# Patient Record
Sex: FEMALE | Race: WHITE | NOT HISPANIC OR LATINO
[De-identification: names, ages, dates, MRNs, and addresses within clinical notes are randomized per-mention and may not be internally consistent; named-entity substitution may affect disease eponyms.]

---

## 2017-01-20 ENCOUNTER — APPOINTMENT (OUTPATIENT)
Dept: PULMONOLOGY | Facility: CLINIC | Age: 77
End: 2017-01-20

## 2017-01-20 ENCOUNTER — APPOINTMENT (OUTPATIENT)
Dept: SURGERY | Facility: CLINIC | Age: 77
End: 2017-01-20

## 2017-01-20 VITALS
DIASTOLIC BLOOD PRESSURE: 71 MMHG | TEMPERATURE: 98.1 F | SYSTOLIC BLOOD PRESSURE: 126 MMHG | OXYGEN SATURATION: 97 % | HEIGHT: 61 IN | HEART RATE: 61 BPM

## 2017-03-10 ENCOUNTER — APPOINTMENT (OUTPATIENT)
Dept: SURGERY | Facility: CLINIC | Age: 77
End: 2017-03-10

## 2017-03-10 VITALS
TEMPERATURE: 98.1 F | DIASTOLIC BLOOD PRESSURE: 72 MMHG | BODY MASS INDEX: 24.92 KG/M2 | HEIGHT: 61 IN | HEART RATE: 64 BPM | WEIGHT: 132 LBS | SYSTOLIC BLOOD PRESSURE: 123 MMHG | OXYGEN SATURATION: 95 %

## 2017-03-10 RX ORDER — CYCLOSPORINE 0.5 MG/ML
0.05 EMULSION OPHTHALMIC
Qty: 60 | Refills: 0 | Status: ACTIVE | COMMUNITY
Start: 2016-03-16

## 2017-03-10 RX ORDER — OXYCODONE AND ACETAMINOPHEN 5; 325 MG/1; MG/1
5-325 TABLET ORAL
Qty: 30 | Refills: 0 | Status: DISCONTINUED | COMMUNITY
Start: 2016-12-23

## 2017-07-18 ENCOUNTER — APPOINTMENT (OUTPATIENT)
Dept: SURGERY | Facility: CLINIC | Age: 77
End: 2017-07-18

## 2017-07-18 VITALS
SYSTOLIC BLOOD PRESSURE: 114 MMHG | OXYGEN SATURATION: 95 % | HEIGHT: 61 IN | DIASTOLIC BLOOD PRESSURE: 71 MMHG | HEART RATE: 70 BPM

## 2017-07-18 DIAGNOSIS — K21.9 GASTRO-ESOPHAGEAL REFLUX DISEASE W/OUT ESOPHAGITIS: ICD-10-CM

## 2017-07-18 DIAGNOSIS — D64.9 ANEMIA, UNSPECIFIED: ICD-10-CM

## 2017-07-18 RX ORDER — CLOBETASOL PROPIONATE 0.5 MG/G
0.05 OINTMENT TOPICAL
Qty: 30 | Refills: 0 | Status: ACTIVE | COMMUNITY
Start: 2017-06-14

## 2017-07-18 RX ORDER — VALACYCLOVIR 1 G/1
1 TABLET, FILM COATED ORAL
Qty: 10 | Refills: 0 | Status: ACTIVE | COMMUNITY
Start: 2017-06-17

## 2017-09-06 ENCOUNTER — OUTPATIENT (OUTPATIENT)
Dept: OUTPATIENT SERVICES | Facility: HOSPITAL | Age: 77
LOS: 1 days | End: 2017-09-06
Payer: MEDICARE

## 2017-09-06 DIAGNOSIS — O03.9 COMPLETE OR UNSPECIFIED SPONTANEOUS ABORTION WITHOUT COMPLICATION: Chronic | ICD-10-CM

## 2017-09-06 DIAGNOSIS — Z90.49 ACQUIRED ABSENCE OF OTHER SPECIFIED PARTS OF DIGESTIVE TRACT: Chronic | ICD-10-CM

## 2017-09-06 DIAGNOSIS — Z90.12 ACQUIRED ABSENCE OF LEFT BREAST AND NIPPLE: Chronic | ICD-10-CM

## 2017-09-06 DIAGNOSIS — Z98.890 OTHER SPECIFIED POSTPROCEDURAL STATES: Chronic | ICD-10-CM

## 2017-09-06 PROCEDURE — 74241: CPT

## 2017-09-06 PROCEDURE — 74241: CPT | Mod: 26

## 2017-09-08 ENCOUNTER — APPOINTMENT (OUTPATIENT)
Dept: SURGERY | Facility: CLINIC | Age: 77
End: 2017-09-08

## 2017-09-15 ENCOUNTER — APPOINTMENT (OUTPATIENT)
Dept: SURGERY | Facility: CLINIC | Age: 77
End: 2017-09-15
Payer: MEDICARE

## 2017-09-15 VITALS
HEIGHT: 61 IN | WEIGHT: 125 LBS | DIASTOLIC BLOOD PRESSURE: 69 MMHG | BODY MASS INDEX: 23.6 KG/M2 | OXYGEN SATURATION: 95 % | HEART RATE: 68 BPM | SYSTOLIC BLOOD PRESSURE: 128 MMHG | TEMPERATURE: 97.8 F

## 2017-09-15 DIAGNOSIS — K44.9 DIAPHRAGMATIC HERNIA W/OUT OBSTRUCTION OR GANGRENE: ICD-10-CM

## 2017-09-15 PROCEDURE — 99213 OFFICE O/P EST LOW 20 MIN: CPT

## 2018-03-06 ENCOUNTER — APPOINTMENT (OUTPATIENT)
Dept: NEUROLOGY | Facility: CLINIC | Age: 78
End: 2018-03-06
Payer: MEDICARE

## 2018-03-06 VITALS
WEIGHT: 125 LBS | BODY MASS INDEX: 23.6 KG/M2 | HEIGHT: 61 IN | HEART RATE: 103 BPM | DIASTOLIC BLOOD PRESSURE: 85 MMHG | OXYGEN SATURATION: 93 % | SYSTOLIC BLOOD PRESSURE: 142 MMHG

## 2018-03-06 DIAGNOSIS — R20.0 ANESTHESIA OF SKIN: ICD-10-CM

## 2018-03-06 DIAGNOSIS — Z00.00 ENCOUNTER FOR GENERAL ADULT MEDICAL EXAMINATION W/OUT ABNORMAL FINDINGS: ICD-10-CM

## 2018-03-06 DIAGNOSIS — R41.3 OTHER AMNESIA: ICD-10-CM

## 2018-03-06 PROCEDURE — 99204 OFFICE O/P NEW MOD 45 MIN: CPT

## 2018-03-06 RX ORDER — GINKGO BILOBA LEAF EXTRACT 120 MG
120 CAPSULE ORAL
Refills: 0 | Status: ACTIVE | COMMUNITY

## 2018-03-06 RX ORDER — DOCUSATE SODIUM 100 MG/1
100 CAPSULE, LIQUID FILLED ORAL
Refills: 0 | Status: ACTIVE | COMMUNITY

## 2018-03-06 RX ORDER — BIOTIN 10 MG
10000 TABLET ORAL
Refills: 0 | Status: ACTIVE | COMMUNITY

## 2018-03-06 RX ORDER — ASCORBIC ACID 500 MG
500 TABLET ORAL DAILY
Refills: 0 | Status: ACTIVE | COMMUNITY

## 2018-03-06 RX ORDER — ESCITALOPRAM OXALATE 20 MG/1
20 TABLET ORAL DAILY
Refills: 0 | Status: ACTIVE | COMMUNITY

## 2018-03-06 RX ORDER — BERBERINE CHLOR/SEAWEED/CHROM 500-250 MG
CAPSULE ORAL
Refills: 0 | Status: ACTIVE | COMMUNITY

## 2018-05-16 ENCOUNTER — OUTPATIENT (OUTPATIENT)
Dept: OUTPATIENT SERVICES | Facility: HOSPITAL | Age: 78
LOS: 1 days | Discharge: HOME | End: 2018-05-16

## 2018-05-16 DIAGNOSIS — Z90.12 ACQUIRED ABSENCE OF LEFT BREAST AND NIPPLE: Chronic | ICD-10-CM

## 2018-05-16 DIAGNOSIS — O03.9 COMPLETE OR UNSPECIFIED SPONTANEOUS ABORTION WITHOUT COMPLICATION: Chronic | ICD-10-CM

## 2018-05-16 DIAGNOSIS — Z90.49 ACQUIRED ABSENCE OF OTHER SPECIFIED PARTS OF DIGESTIVE TRACT: Chronic | ICD-10-CM

## 2018-05-16 DIAGNOSIS — Z98.890 OTHER SPECIFIED POSTPROCEDURAL STATES: Chronic | ICD-10-CM

## 2018-06-20 ENCOUNTER — APPOINTMENT (OUTPATIENT)
Dept: NEUROLOGY | Facility: CLINIC | Age: 78
End: 2018-06-20

## 2018-07-25 ENCOUNTER — OUTPATIENT (OUTPATIENT)
Dept: OUTPATIENT SERVICES | Facility: HOSPITAL | Age: 78
LOS: 1 days | Discharge: HOME | End: 2018-07-25

## 2018-07-25 DIAGNOSIS — Z90.49 ACQUIRED ABSENCE OF OTHER SPECIFIED PARTS OF DIGESTIVE TRACT: Chronic | ICD-10-CM

## 2018-07-25 DIAGNOSIS — Z98.890 OTHER SPECIFIED POSTPROCEDURAL STATES: Chronic | ICD-10-CM

## 2018-07-25 DIAGNOSIS — O03.9 COMPLETE OR UNSPECIFIED SPONTANEOUS ABORTION WITHOUT COMPLICATION: Chronic | ICD-10-CM

## 2018-07-25 DIAGNOSIS — Z90.12 ACQUIRED ABSENCE OF LEFT BREAST AND NIPPLE: Chronic | ICD-10-CM

## 2018-11-05 ENCOUNTER — APPOINTMENT (OUTPATIENT)
Dept: NEUROLOGY | Facility: CLINIC | Age: 78
End: 2018-11-05

## 2019-12-23 ENCOUNTER — APPOINTMENT (OUTPATIENT)
Dept: NEUROSURGERY | Facility: CLINIC | Age: 79
End: 2019-12-23
Payer: MEDICARE

## 2019-12-23 VITALS
HEART RATE: 75 BPM | DIASTOLIC BLOOD PRESSURE: 70 MMHG | RESPIRATION RATE: 17 BRPM | HEIGHT: 61 IN | SYSTOLIC BLOOD PRESSURE: 111 MMHG | TEMPERATURE: 98.1 F | BODY MASS INDEX: 22.09 KG/M2 | OXYGEN SATURATION: 95 % | WEIGHT: 117 LBS

## 2019-12-23 DIAGNOSIS — Z87.01 PERSONAL HISTORY OF PNEUMONIA (RECURRENT): ICD-10-CM

## 2019-12-23 DIAGNOSIS — M81.0 AGE-RELATED OSTEOPOROSIS W/OUT CURRENT PATHOLOGICAL FRACTURE: ICD-10-CM

## 2019-12-23 DIAGNOSIS — Z82.49 FAMILY HISTORY OF ISCHEMIC HEART DISEASE AND OTHER DISEASES OF THE CIRCULATORY SYSTEM: ICD-10-CM

## 2019-12-23 DIAGNOSIS — Z81.8 FAMILY HISTORY OF OTHER MENTAL AND BEHAVIORAL DISORDERS: ICD-10-CM

## 2019-12-23 DIAGNOSIS — J44.9 CHRONIC OBSTRUCTIVE PULMONARY DISEASE, UNSPECIFIED: ICD-10-CM

## 2019-12-23 DIAGNOSIS — F41.9 ANXIETY DISORDER, UNSPECIFIED: ICD-10-CM

## 2019-12-23 DIAGNOSIS — K75.9 INFLAMMATORY LIVER DISEASE, UNSPECIFIED: ICD-10-CM

## 2019-12-23 DIAGNOSIS — Z82.61 FAMILY HISTORY OF ARTHRITIS: ICD-10-CM

## 2019-12-23 DIAGNOSIS — M19.90 UNSPECIFIED OSTEOARTHRITIS, UNSPECIFIED SITE: ICD-10-CM

## 2019-12-23 DIAGNOSIS — J42 UNSPECIFIED CHRONIC BRONCHITIS: ICD-10-CM

## 2019-12-23 PROCEDURE — 99205 OFFICE O/P NEW HI 60 MIN: CPT

## 2019-12-23 NOTE — PHYSICAL EXAM
[General Appearance - Alert] : alert [Oriented To Time, Place, And Person] : oriented to person, place, and time [General Appearance - Well-Appearing] : healthy appearing [Motor Handedness Right-Handed] : the patient is right hand dominant [Sensation Tactile Decrease] : light touch was intact [Sensation Pain / Temperature Decrease] : pain and temperature was intact [Balance] : balance was intact [Neck Appearance] : the appearance of the neck was normal [Sclera] : the sclera and conjunctiva were normal [Outer Ear] : the ears and nose were normal in appearance [Abnormal Walk] : normal gait [] : no respiratory distress [Skin Color & Pigmentation] : normal skin color and pigmentation

## 2019-12-23 NOTE — HISTORY OF PRESENT ILLNESS
[de-identified] : 79F never smoker with a medical history of COPD, asthma, paraesophageal hiatal hernia, GI bleed and left breast CA (on remission) s/p left mastectomy presents to the office to evaluate a known aneurysm. She was referred by her PCP, Dr. Fountain. The aneurysm was incidentally found in 2018 during a workup after experiencing RIGHT arm numbness/tingling/loss of hand  . She reports an aneurysm size of 3.3 mm in 2018 and with a growth to 6.0 mm of an anterior communicating artery aneurysm in December 2019. MRA also reports a absent flow signal within the A1 segment of the LEFT URVASHI.\par \par She denies headaches. She denies history of HTN or family history of brain aneurysm. She is not currently on ASA or blood thinners.\par \par \par Handedness:\par \par Patient Address:\par [address]\[place] [address]\par \par Referring MD and PCP:\par [address] [address]\par \par Neurologist:\par Dr. Leo Hemphill\par [address] [facility], [address]\[place] [address]\par \par \par

## 2019-12-23 NOTE — SOCIAL HISTORY
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

## 2019-12-23 NOTE — REVIEW OF SYSTEMS
[Arm Weakness] : arm weakness [Hand Weakness] :  hand weakness [Numbness] : numbness [Tingling] : tingling [Hypersensitivity] : hypersensitivity [Dry Eyes] : dryness of the eyes [Loss Of Hearing] : hearing loss [Joint Pain] : joint pain [Shortness Of Breath] : shortness of breath [Wheezing] : wheezing [Joint Swelling] : joint swelling [Joint Stiffness] : joint stiffness [Itching] : itching [Muscle Weakness] : muscle weakness [Feelings Of Weakness] : feelings of weakness [Negative] : Genitourinary

## 2019-12-23 NOTE — DATA REVIEWED
[de-identified] : BRAIN W/O CONTRAST 12/11/19 IMPRESSION:\par 1. 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 Absent flow signal w/in the A1 segment of the left URVASHI.\par 3. Of note, noninvasive methods such as MRA and CTA do not entirely exclude the presence of all aneurysms, particularily those of approx 3-4mm or less in size.\par BRAIN/NECK WITH AND W/O CONTRAST 11/2/18 IMPRESSION:\par 1. MRA BRAIN: There is no significant segmental stenosis involving the proximal intracranial arteries. 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 There is bulbous appearance of anterior communicating artery with an approximately 3 mm bilobed aneurysm.\par 2. MRA NECK: No evidence of large vessel occlusion or stenosis. There is approximately 30% narrowing of the left internal carotid artery bulb.

## 2019-12-23 NOTE — ASSESSMENT
[FreeTextEntry1] : Plan:\par - All imaging has been reviewed by Dr. Ceballos, in depth, and with patient. All questions answered.\par - Patient was extensively educated regarding his disease process, ACOMM Cerebral Aneurysm.\par - Dr. Ceballos has reviewed both surgical and non-surgical options with patient. He is recommending a diagnostic cerebral angiogram with possible coiling of ACOMM aneurysm.\par - Dr. Ceballos has reviewed the risks and benefits of this procedure to include, but not limit, pain, infection, stroke, hematoma, recurrence of disease, neurovascular injury, heart attack, pulmonary embolism, weakness and death have been carefully explained to the patient who clearly understands and agrees to proceed. Post-op care to include a 2 week f/u in office as well as a 3 month follow-up with new MRA.\par - Date of 1/17/20 has been agreed upon by patient, I have reviewed medical clearance packet with her to include Cardiology and probable Pulmonology clearance for hx of COPD/Asthma. Patient states that PCP has been handling all Pulm related issues, noted that PCP is to discern if patient will require Pulm clearance. Information provided for both Dr's Lyric (Cardi) and Amarjit (Pulm) should she require them. \par \par Patient verbalizes understanding and agreement with current plan.\par  \par \par

## 2020-01-17 ENCOUNTER — INPATIENT (INPATIENT)
Facility: HOSPITAL | Age: 80
LOS: 1 days | Discharge: HOME | DRG: 27 | End: 2020-01-19
Attending: RADIOLOGY | Admitting: RADIOLOGY
Payer: MEDICARE

## 2020-01-17 VITALS
DIASTOLIC BLOOD PRESSURE: 57 MMHG | HEART RATE: 87 BPM | SYSTOLIC BLOOD PRESSURE: 124 MMHG | OXYGEN SATURATION: 100 % | RESPIRATION RATE: 12 BRPM | TEMPERATURE: 98 F

## 2020-01-17 DIAGNOSIS — Z98.49 CATARACT EXTRACTION STATUS, UNSPECIFIED EYE: Chronic | ICD-10-CM

## 2020-01-17 DIAGNOSIS — Z90.12 ACQUIRED ABSENCE OF LEFT BREAST AND NIPPLE: Chronic | ICD-10-CM

## 2020-01-17 DIAGNOSIS — D05.12 INTRADUCTAL CARCINOMA IN SITU OF LEFT BREAST: ICD-10-CM

## 2020-01-17 DIAGNOSIS — J44.9 CHRONIC OBSTRUCTIVE PULMONARY DISEASE, UNSPECIFIED: ICD-10-CM

## 2020-01-17 DIAGNOSIS — Z90.49 ACQUIRED ABSENCE OF OTHER SPECIFIED PARTS OF DIGESTIVE TRACT: Chronic | ICD-10-CM

## 2020-01-17 DIAGNOSIS — Z98.890 OTHER SPECIFIED POSTPROCEDURAL STATES: Chronic | ICD-10-CM

## 2020-01-17 DIAGNOSIS — E78.5 HYPERLIPIDEMIA, UNSPECIFIED: ICD-10-CM

## 2020-01-17 DIAGNOSIS — B18.2 CHRONIC VIRAL HEPATITIS C: ICD-10-CM

## 2020-01-17 DIAGNOSIS — O03.9 COMPLETE OR UNSPECIFIED SPONTANEOUS ABORTION WITHOUT COMPLICATION: Chronic | ICD-10-CM

## 2020-01-17 DIAGNOSIS — I67.1 CEREBRAL ANEURYSM, NONRUPTURED: ICD-10-CM

## 2020-01-17 PROCEDURE — 36223 PLACE CATH CAROTID/INOM ART: CPT | Mod: 59,LT

## 2020-01-17 PROCEDURE — 76377 3D RENDER W/INTRP POSTPROCES: CPT | Mod: 26

## 2020-01-17 PROCEDURE — 99291 CRITICAL CARE FIRST HOUR: CPT

## 2020-01-17 PROCEDURE — 61624 TCAT PERM OCCLS/EMBOLJ CNS: CPT

## 2020-01-17 PROCEDURE — 36224 PLACE CATH CAROTD ART: CPT | Mod: RT

## 2020-01-17 PROCEDURE — 75898 FOLLOW-UP ANGIOGRAPHY: CPT | Mod: 26

## 2020-01-17 PROCEDURE — 99223 1ST HOSP IP/OBS HIGH 75: CPT

## 2020-01-17 PROCEDURE — 75894 X-RAYS TRANSCATH THERAPY: CPT | Mod: 26

## 2020-01-17 RX ORDER — HYDRALAZINE HCL 50 MG
5 TABLET ORAL ONCE
Refills: 0 | Status: COMPLETED | OUTPATIENT
Start: 2020-01-17 | End: 2020-01-17

## 2020-01-17 RX ORDER — ESCITALOPRAM OXALATE 10 MG/1
10 TABLET, FILM COATED ORAL DAILY
Refills: 0 | Status: DISCONTINUED | OUTPATIENT
Start: 2020-01-17 | End: 2020-01-19

## 2020-01-17 RX ORDER — SENNA PLUS 8.6 MG/1
2 TABLET ORAL AT BEDTIME
Refills: 0 | Status: DISCONTINUED | OUTPATIENT
Start: 2020-01-17 | End: 2020-01-19

## 2020-01-17 RX ORDER — CHLORHEXIDINE GLUCONATE 213 G/1000ML
1 SOLUTION TOPICAL ONCE
Refills: 0 | Status: DISCONTINUED | OUTPATIENT
Start: 2020-01-17 | End: 2020-01-18

## 2020-01-17 RX ORDER — IPRATROPIUM/ALBUTEROL SULFATE 18-103MCG
3 AEROSOL WITH ADAPTER (GRAM) INHALATION EVERY 6 HOURS
Refills: 0 | Status: DISCONTINUED | OUTPATIENT
Start: 2020-01-17 | End: 2020-01-19

## 2020-01-17 RX ORDER — ONDANSETRON 8 MG/1
4 TABLET, FILM COATED ORAL EVERY 6 HOURS
Refills: 0 | Status: DISCONTINUED | OUTPATIENT
Start: 2020-01-17 | End: 2020-01-19

## 2020-01-17 RX ORDER — ESCITALOPRAM OXALATE 10 MG/1
2 TABLET, FILM COATED ORAL
Qty: 0 | Refills: 0 | DISCHARGE

## 2020-01-17 RX ORDER — SODIUM CHLORIDE 9 MG/ML
1000 INJECTION INTRAMUSCULAR; INTRAVENOUS; SUBCUTANEOUS
Refills: 0 | Status: DISCONTINUED | OUTPATIENT
Start: 2020-01-17 | End: 2020-01-18

## 2020-01-17 RX ORDER — METOCLOPRAMIDE HCL 10 MG
10 TABLET ORAL EVERY 6 HOURS
Refills: 0 | Status: DISCONTINUED | OUTPATIENT
Start: 2020-01-17 | End: 2020-01-19

## 2020-01-17 RX ORDER — ASPIRIN/CALCIUM CARB/MAGNESIUM 324 MG
81 TABLET ORAL DAILY
Refills: 0 | Status: DISCONTINUED | OUTPATIENT
Start: 2020-01-17 | End: 2020-01-19

## 2020-01-17 RX ORDER — ACETAMINOPHEN 500 MG
650 TABLET ORAL EVERY 6 HOURS
Refills: 0 | Status: DISCONTINUED | OUTPATIENT
Start: 2020-01-17 | End: 2020-01-19

## 2020-01-17 RX ADMIN — Medication 1 DROP(S): at 19:11

## 2020-01-17 RX ADMIN — Medication 1 DROP(S): at 23:50

## 2020-01-17 RX ADMIN — Medication 1 DROP(S): at 16:14

## 2020-01-17 RX ADMIN — Medication 5 MILLIGRAM(S): at 16:14

## 2020-01-17 RX ADMIN — Medication 5 MILLIGRAM(S): at 21:39

## 2020-01-17 RX ADMIN — Medication 10 MILLIGRAM(S): at 19:25

## 2020-01-17 RX ADMIN — SENNA PLUS 2 TABLET(S): 8.6 TABLET ORAL at 21:39

## 2020-01-17 RX ADMIN — ONDANSETRON 4 MILLIGRAM(S): 8 TABLET, FILM COATED ORAL at 18:45

## 2020-01-17 RX ADMIN — Medication 1 DROP(S): at 21:38

## 2020-01-17 NOTE — BRIEF OPERATIVE NOTE - NSICDXBRIEFPROCEDURE_GEN_ALL_CORE_FT
PROCEDURES:  Angiogram, cerebral, with embolization 17-Jan-2020 11:33:47 ACOMM artery Edmundo Keating

## 2020-01-17 NOTE — H&P ADULT - NSHPSOCIALHISTORY_GEN_ALL_CORE
Patient lives alone in an elevator apartment. She quit smoking in the 1970s. She does not drink alcohol but smokes Marijuana.

## 2020-01-17 NOTE — H&P ADULT - ASSESSMENT
79 year old female w/ COPD, HLD, Hepatitis C, h/o Left Breast Cancer with incidental finding of ACOMM artery aneurysm.

## 2020-01-17 NOTE — H&P ADULT - NSHPPHYSICALEXAM_GEN_ALL_CORE
Gen: NAD, AAOx3. WN/WD.  HEENT: PERRL. EOMI. NC/AT. VF intact.  Neck: FROM, nontender  Lungs: Clear b/l, no W/R/R.  Heart: S1, S2. NSR.  Abd: Soft, NT/ND. +BS  Exts: Pulses 2+ throughout, no C/C/E.  Neuro: CNs II-XII intact. 5/5 str x4 extremities. Sensation to LT intact. Speech clear. Following commands. Gait WNL. Negative pronator drift.

## 2020-01-17 NOTE — H&P ADULT - HISTORY OF PRESENT ILLNESS
79 y.o. female with PMH of HLD, COPD, Hepatitis C, Breast Cancer s/p chemo/RT/left mastectomy presents today for elective cerebral angiogram and possible coiling of ACOMM artery aneurysm. Patient states being previously admitted for right arm numbness and tingling one year ago with MRI Brain performed finding a 3mm ACOMM aneurysm which was observed. On repeat studying some months later the aneurysm doubled in size to 6mm as per patient. She denies any symptoms associated with the aneurysm but states chronic headaches >2 years which she attributed to Cataracts or sinus congestion. She otherwise denies visual or hearing changes, extremity weakness or numbness, gait disturbance, syncope or seizures. She denies use of Aspirin or other anticoagulation.

## 2020-01-17 NOTE — H&P ADULT - NSICDXPASTMEDICALHX_GEN_ALL_CORE_FT
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

## 2020-01-17 NOTE — PROGRESS NOTE ADULT - ASSESSMENT
79y/F with  1.  unruptured anterior communicating aneurysm, s/p angio, embolization (01/17/2020, Dr. Ceballos)  2.  dyslipidemia  3.  h/o skin cancer, breast cancer  4.  nephrolithiasis  5.  dry eyes, glaucoma  6.  COPD, paraesophageal hernia, hiatal hernia  7.  Hepatitis C    PLAN:   NEURO: neurochecks q1h, PRN pain meds with Tylenol  groin checks  continue escitalopram  REHAB:  physical therapy evaluation and management - defer  EARLY MOB:  flat x 4h then HOB up    PULM:  Room air, incentive spirometry  CARDIO:  SBP goal 100-150mm Hg  ENDO:  Blood sugar goals 140-180 mg/dL  GI:  bowel regimen  DIET: advance as tolerated  RENAL:  IVF until adequate PO intake  HEM/ONC: check post-procedure Hb  VTE Prophylaxis: SCDs, no DVT chemoprophylaxis for now as patient is high risk for bleed (fresh post-procedure)  ID: afebrile, no leukocytosis  Social: will update family    ATTENDING ATTESTATION:  I was physically present for the key portions of the evaluation and management (E/M) service provided.  I agree with the above history, physical and plan, which I have reviewed and edited where appropriate.    Patient at high risk for neurological deterioration or death due to:  ICU delirium, aspiration PNA, DVT / PE.  Critical care time:  I have personally provided 60 minutes of critical care time, excluding time spent on separate procedures.      Plan discussed with RN, house staff.

## 2020-01-17 NOTE — PROGRESS NOTE ADULT - SUBJECTIVE AND OBJECTIVE BOX
=================================  NEUROCRITICAL CARE ATTENDING NOTE  =================================    HAROLDO PEARSON   MRN-[id number]  Summary:  79y/F with dyslipidemia COPD Hep C Breast CA s/p chemoRT / mastectomy; presented with R arm numbness / tingling 1 year ago.  MRI revealed 3mm Acomm aneurysm.  On subsequent imaging, aneurysm doubled in size.  Patient now admitted for elective coiling of aneurysm. 01/17.    COURSE IN THE HOSPITAL:  01/17 admitted to [facility], s/p coiling    Past Medical History: HLD (hyperlipidemia) Skin cancer Hepatitis C Dry eyes Glaucoma DCIS (ductal carcinoma in situ) of breast Kidney stone Hiatal hernia COPD (chronic obstructive pulmonary disease) Paraesophageal hernia  Allergies:  adhesives (Rash) penicillin (Other) tetanus toxoid (Other)   Home meds:  escitalopram 5 mg oral tablet: 2 tab(s) orally once a day    PHYSICAL EXAMINATION  T(C): 36.4 (01-17 @ 11:58), Max: 36.4 (01-17 @ 11:58) HR: 65 (01-17 @ 12:46) (65 - 87) BP: 123/56 (01-17 @ 12:46) (117/53 - 124/57) RR: 14 (01-17 @ 12:46) (11 - 14) SpO2: 100% (01-17 @ 12:46) (99% - 100%)  NEUROLOGIC EXAMINATION:  Patient is awake, alert, fully oriented, pupils 2-3mm equal and briskly reactive to light, EOMs intact, muscle strength 5/5 on all 4s.  GENERAL: not intubated, not in cardiorespiratory distress  EENT:  anicteric  CARDIOVASCULAR: (+) S1 S2, normal rate and regular rhythm  PULMONARY: clear to auscultation bilaterally  ABDOMEN: soft, nontender with normoactive bowel sounds  EXTREMITIES: no edema  SKIN: no rash    LABS:             11.0   7.04  )-----------( 268      ( 17 Jan 2020 12:20 )             34.0     140  |  103  |  22  ----------------------------<  123<H>  4.1   |  24  |  0.80    Ca    7.9<L>      17 Jan 2020 12:20  Phos  5.5     01-17  Mg     1.8     01-17    TPro  5.9<L>  /  Alb  3.6  /  TBili  0.3  /  DBili  x   /  AST  <5<L>  /  ALT  10  /  AlkPhos  67  01-17 01-17 @ 07:01 - 01-17 @ 13:54  IN: 150 mL / OUT: 75 mL / NET: 75 mL    Bacteriology:  CSF studies:  EEG:  Neuroimaging:  Other imaging:    MEDICATIONS: ASA 81mg PO daily escitalopram 10mg PO daily ipratropium / albuterol PRN     IV FLUIDS: NS@100cc/hr  DRIPS:  DIET: advance as tolerated  Lines:  Drains:    Wounds:    CODE STATUS:  Full Code                       GOALS OF CARE:  aggressive                      DISPOSITION:  ICU

## 2020-01-17 NOTE — H&P ADULT - PROBLEM SELECTOR PLAN 1
Plan for cerebral angiogram with possible coil embolization of ACOMM artery aneurysm,  Outpatient clearance reviewed,  Plan to admit to Dr. Ceballos, Neurosurgery ICU if treated,  Consent in chart, all R/B/A discussed,  D/w Dr. Ceballos

## 2020-01-17 NOTE — PROGRESS NOTE ADULT - SUBJECTIVE AND OBJECTIVE BOX
NEUROSURGERY POST-OP NOTE:    Patient s/p cerebral angiogram for coil embolization of acomm aneurysm. Loaded with heparin during procedure, which was then stopped.     HPI:  79 y.o. female with PMH of HLD, COPD, Hepatitis C, Breast Cancer s/p chemo/RT/left mastectomy presents today for elective cerebral angiogram and possible coiling of ACOMM artery aneurysm. Patient states being previously admitted for right arm numbness and tingling one year ago with MRI Brain performed finding a 3mm ACOMM aneurysm which was observed. On repeat studying some months later the aneurysm doubled in size to 6mm as per patient. She denies any symptoms associated with the aneurysm but states chronic headaches >2 years which she attributed to Cataracts or sinus congestion. She otherwise denies visual or hearing changes, extremity weakness or numbness, gait disturbance, syncope or seizures. She denies use of Aspirin or other anticoagulation. (2020 07:27)        Hospital Course:   POD#0: s/p cerebral angiogram for coil embolization of acomm aneurysm. Loaded with heparin during procedure, which was then stopped. Started on ASA 81      Vital Signs Last 24 Hrs  T(C): 36.4 (2020 11:58), Max: 36.4 (2020 11:58)  T(F): 97.6 (2020 11:58), Max: 97.6 (2020 11:58)  HR: 64 (2020 14:15) (64 - 87)  BP: 131/60 (2020 14:15) (117/53 - 145/68)  BP(mean): 86 (2020 14:15) (77 - 98)  RR: 13 (2020 14:15) (11 - 14)  SpO2: 96% (2020 14:15) (95% - 100%)    I&O's Detail    2020 07:01  -  2020 14:51  --------------------------------------------------------  IN:    sodium chloride 0.9%.: 250 mL  Total IN: 250 mL    OUT:    Intermittent Catheterization - Urethral: 200 mL  Total OUT: 200 mL    Total NET: 50 mL        I&O's Summary    2020 07:01  -  2020 14:51  --------------------------------------------------------  IN: 250 mL / OUT: 200 mL / NET: 50 mL        PHYSICAL EXAM:  AAOx3, NAD, coherent speech, following commands  PERRL, EOMI, face symmetric  MAEx4, strength 5/5 UE and LE b/l, no drift  SILT throughout   Extremities: distal extremities warm and well perfused, pulses intact  Incision/wound: right groin incision clean, dry and intact; +dressing in place, no hemorrhage or hematoma      TUBES/LINES:  [] CVC  [x] A-line  [] Lumbar Drain  [] Ventriculostomy  [] BRIAN  [x] Henderson  [] NGT   [] Other    DIET:  [x] NPO  [] Mechanical  [] Tube feeds    LABS:                        11.0   7.04  )-----------( 268      ( 2020 12:20 )             34.0     -    140  |  103  |  22  ----------------------------<  123<H>  4.1   |  24  |  0.80    Ca    7.9<L>      2020 12:20  Phos  5.5       Mg     1.8         TPro  5.9<L>  /  Alb  3.6  /  TBili  0.3  /  DBili  x   /  AST  <5<L>  /  ALT  10  /  AlkPhos  67  01-17    PT/INR - ( 2020 12:21 )   PT: 14.5 sec;   INR: 1.26          PTT - ( 2020 12:21 )  PTT:>200.0 sec        CAPILLARY BLOOD GLUCOSE          Drug Levels: [] N/A    CSF Analysis: [] N/A      Allergies    adhesives (Rash)  penicillin (Other)  tetanus toxoid (Other)    Intolerances        Home Medications:  escitalopram 5 mg oral tablet: 2 tab(s) orally once a day (2020 08:53)      MEDICATIONS:  Antibiotics:    Neuro:  acetaminophen   Tablet .. 650 milliGRAM(s) Oral every 6 hours PRN  escitalopram 10 milliGRAM(s) Oral daily  ondansetron Injectable 4 milliGRAM(s) IV Push every 6 hours PRN    Anticoagulation:  aspirin  chewable 81 milliGRAM(s) Oral daily    OTHER:  albuterol/ipratropium for Nebulization 3 milliLiter(s) Nebulizer every 6 hours PRN  artificial  tears Solution 1 Drop(s) Both EYES every 2 hours PRN  bisacodyl 5 milliGRAM(s) Oral at bedtime  chlorhexidine 4% Liquid 1 Application(s) Topical once  senna 2 Tablet(s) Oral at bedtime    IVF:  sodium chloride 0.9%. 1000 milliLiter(s) IV Continuous <Continuous>    CULTURES:    RADIOLOGY & ADDITIONAL TESTS:      ASSESSMENT:  79y Female s/p cerebral angiogram for coil embolization of acomm aneurysm. Loaded with heparin during procedure, which was then stopped. ASA 81 started    I67.1  No pertinent family history in first degree relatives  HLD (hyperlipidemia)  Skin cancer  Hepatitis C  Dry eyes  Glaucoma  DCIS (ductal carcinoma in situ) of breast  Kidney stone  Hiatal hernia  COPD (chronic obstructive pulmonary disease)  Paraesophageal hernia  Anterior communicating artery aneurysm  Anterior communicating artery aneurysm  HLD (hyperlipidemia)  Ductal carcinoma in situ (DCIS) of left breast  Chronic hepatitis C without hepatic coma  COPD (chronic obstructive pulmonary disease)  Cerebral aneurysm without rupture  Angiogram, cerebral, with embolization  S/P cataract surgery  S/P Mohs surgery for basal cell carcinoma  S/P wrist surgery  S/P appendectomy  S/P mastectomy, left  S/P mastectomy, left        PLAN:  NEURO:  -neuro/vital checks  -groin/pulse checks  -HOB flat x 4 hours   -pain control prn  -ASA 91mg     CARDIOVASCULAR:  -SBP goal normotensive  -remove teja in the am     PULMONARY:  -room air, satting well     RENAL:  -IVF  -henderson in place  -tov in am     GI:  -NPO for now  -advance diet once HOB up     HEME:  -trend h/h    ID:  -afebrile     ENDO:  -ISS    DVT PROPHYLAXIS:  [x] Venodynes                                [] Heparin/Lovenox    FALL RISK:  [] Low Risk                                    [] Impulsive    DISPOSITION:   -ICU status   -Full code  -PT/OT pending   -Discussed with Dr. Ceballos and Dr. Richter       Assessment:  Present when checked    []  GCS  E   V  M     Heart Failure: []Acute, [] acute on chronic , []chronic  Heart Failure:  [] Diastolic (HFpEF), [] Systolic (HFrEF), []Combined (HFpEF and HFrEF), [] RHF, [] Pulm HTN, [] Other    [] JULITA, [] ATN, [] AIN, [] other  [] CKD1, [] CKD2, [] CKD 3, [] CKD 4, [] CKD 5, []ESRD    Encephalopathy: [] Metabolic, [] Hepatic, [] toxic, [] Neurological, [] Other    Abnormal Nurtitional Status: [] malnurtition (see nutrition note), [ ]underweight: BMI < 19, [] morbid obesity: BMI >40, [] Cachexia    [] Sepsis  [] hypovolemic shock,[] cardiogenic shock, [] hemorrhagic shock, [] neuogenic shock  [] Acute Respiratory Failure  []Cerebral edema, [] Brain compression/ herniation,   [] Functional quadriplegia  [] Acute blood loss anemia

## 2020-01-17 NOTE — BRIEF OPERATIVE NOTE - OPERATION/FINDINGS
208 Femoral cerebral angiogram performed under general anesthesia using a 6Fr long sheath via the right CFA. Left carotid injection with absent URVASHI. Right carotid injection with URVASHI/ACOMM complex with a [id number] ACOMM artery aneurysm with 2mm neck. Patient was loaded with Heparin during the procedure, final . Coil embolization of ACOMM artery performed. Post treatment injection with no vessel occlusion or evidence of contrast extravasation. Right groin was perclosed with 3 minutes of manual compression, hemostasis obtained. Patient successfully extubated and remained hemodynamically stable throughout the procedure.    Full report to follow, discussed with Dr. Ceballos.

## 2020-01-18 ENCOUNTER — TRANSCRIPTION ENCOUNTER (OUTPATIENT)
Age: 80
End: 2020-01-18

## 2020-01-18 LAB
ANION GAP SERPL CALC-SCNC: 12 MMOL/L — SIGNIFICANT CHANGE UP (ref 5–17)
BUN SERPL-MCNC: 14 MG/DL — SIGNIFICANT CHANGE UP (ref 7–23)
CALCIUM SERPL-MCNC: 8.2 MG/DL — LOW (ref 8.4–10.5)
CHLORIDE SERPL-SCNC: 108 MMOL/L — SIGNIFICANT CHANGE UP (ref 96–108)
CO2 SERPL-SCNC: 24 MMOL/L — SIGNIFICANT CHANGE UP (ref 22–31)
CREAT SERPL-MCNC: 0.78 MG/DL — SIGNIFICANT CHANGE UP (ref 0.5–1.3)
GLUCOSE SERPL-MCNC: 110 MG/DL — HIGH (ref 70–99)
HCT VFR BLD CALC: 33.4 % — LOW (ref 34.5–45)
HGB BLD-MCNC: 10.4 G/DL — LOW (ref 11.5–15.5)
MAGNESIUM SERPL-MCNC: 1.9 MG/DL — SIGNIFICANT CHANGE UP (ref 1.6–2.6)
MCHC RBC-ENTMCNC: 29.1 PG — SIGNIFICANT CHANGE UP (ref 27–34)
MCHC RBC-ENTMCNC: 31.1 GM/DL — LOW (ref 32–36)
MCV RBC AUTO: 93.6 FL — SIGNIFICANT CHANGE UP (ref 80–100)
NRBC # BLD: 0 /100 WBCS — SIGNIFICANT CHANGE UP (ref 0–0)
PHOSPHATE SERPL-MCNC: 3.3 MG/DL — SIGNIFICANT CHANGE UP (ref 2.5–4.5)
PLATELET # BLD AUTO: 258 K/UL — SIGNIFICANT CHANGE UP (ref 150–400)
POTASSIUM SERPL-MCNC: 4.1 MMOL/L — SIGNIFICANT CHANGE UP (ref 3.5–5.3)
POTASSIUM SERPL-SCNC: 4.1 MMOL/L — SIGNIFICANT CHANGE UP (ref 3.5–5.3)
RBC # BLD: 3.57 M/UL — LOW (ref 3.8–5.2)
RBC # FLD: 12.9 % — SIGNIFICANT CHANGE UP (ref 10.3–14.5)
SODIUM SERPL-SCNC: 144 MMOL/L — SIGNIFICANT CHANGE UP (ref 135–145)
WBC # BLD: 9.94 K/UL — SIGNIFICANT CHANGE UP (ref 3.8–10.5)
WBC # FLD AUTO: 9.94 K/UL — SIGNIFICANT CHANGE UP (ref 3.8–10.5)

## 2020-01-18 PROCEDURE — 99238 HOSP IP/OBS DSCHRG MGMT 30/<: CPT

## 2020-01-18 PROCEDURE — 99233 SBSQ HOSP IP/OBS HIGH 50: CPT | Mod: 24

## 2020-01-18 RX ORDER — ASPIRIN/CALCIUM CARB/MAGNESIUM 324 MG
1 TABLET ORAL
Qty: 0 | Refills: 0 | DISCHARGE
Start: 2020-01-18

## 2020-01-18 RX ORDER — INFLUENZA VIRUS VACCINE 15; 15; 15; 15 UG/.5ML; UG/.5ML; UG/.5ML; UG/.5ML
0.5 SUSPENSION INTRAMUSCULAR ONCE
Refills: 0 | Status: DISCONTINUED | OUTPATIENT
Start: 2020-01-18 | End: 2020-01-19

## 2020-01-18 RX ADMIN — ESCITALOPRAM OXALATE 10 MILLIGRAM(S): 10 TABLET, FILM COATED ORAL at 12:44

## 2020-01-18 NOTE — PHYSICAL THERAPY INITIAL EVALUATION ADULT - CRITERIA FOR SKILLED THERAPEUTIC INTERVENTIONS
functional limitations in following categories/therapy frequency/anticipated discharge recommendation/impairments found/risk reduction/prevention/rehab potential

## 2020-01-18 NOTE — PHYSICAL THERAPY INITIAL EVALUATION ADULT - ADDITIONAL COMMENTS
Patient lives alone in an elevator apartment with no steps to enter. Prior to admission, patient was independent for all functional mobility and ADLs without assistive device. Denies history of falls or home health assistance

## 2020-01-18 NOTE — PHYSICAL THERAPY INITIAL EVALUATION ADULT - GENERAL OBSERVATIONS, REHAB EVAL
Received semi-Cobian's position in bed with +tele, +pulse ox, on room air, friend present, +hep lock, in no apparent distress. Patient appears to be resting comfortably in bed

## 2020-01-18 NOTE — PROGRESS NOTE ADULT - SUBJECTIVE AND OBJECTIVE BOX
HPI:  79 y.o. female with PMH of HLD, COPD, Hepatitis C, Breast Cancer s/p chemo/RT/left mastectomy presents today for elective cerebral angiogram and possible coiling of ACOMM artery aneurysm. Patient states being previously admitted for right arm numbness and tingling one year ago with MRI Brain performed finding a 3mm ACOMM aneurysm which was observed. On repeat studying some months later the aneurysm doubled in size to 6mm as per patient. She denies any symptoms associated with the aneurysm but states chronic headaches >2 years which she attributed to Cataracts or sinus congestion. She otherwise denies visual or hearing changes, extremity weakness or numbness, gait disturbance, syncope or seizures. She denies use of Aspirin or other anticoagulation. (2020 07:27)        Hospital Course:   POD#0: s/p cerebral angiogram for coil embolization of acomm aneurysm. Loaded with heparin during procedure, which was then stopped. Started on ASA 81  POD#1: DESEAN overnight. Transfer to SDU today. PT/OT      Vital Signs Last 24 Hrs  T(C): 37 (2020 10:12), Max: 37.4 (2020 21:45)  T(F): 98.6 (2020 10:12), Max: 99.3 (2020 21:45)  HR: 65 (2020 12:00) (62 - 93)  BP: 166/72 (2020 12:00) (114/36 - 166/72)  BP(mean): 104 (2020 12:00) (66 - 109)  RR: 24 (2020 12:00) (12 - 40)  SpO2: 96% (2020 12:00) (84% - 100%)    I&O's Detail    2020 07:01  -  2020 07:00  --------------------------------------------------------  IN:    Oral Fluid: 100 mL    sodium chloride 0.9%: 1850 mL  Total IN: 1950 mL    OUT:    Intermittent Catheterization - Urethral: 565 mL    Voided: 200 mL  Total OUT: 765 mL    Total NET: 1185 mL        I&O's Summary    2020 07:01  -  2020 07:00  --------------------------------------------------------  IN: 1950 mL / OUT: 765 mL / NET: 1185 mL        PHYSICAL EXAM:  Neurological:  AAOx3, NAD, coherent speech, following commands  PERRL, EOMI, face symmetric  MAEx4, strength 5/5 UE and LE b/l, no drift  SILT throughout   Extremities: distal extremities warm and well perfused, pulses intact  Incision/wound: right groin incision clean, dry and intact; no hemorrhage or hematoma      TUBES/LINES:  [] CVC  [] A-line  [] Lumbar Drain  [] Ventriculostomy  [] BRIAN  [] Martínez  [] NGT   [] Other    DIET:  [] NPO  [] Mechanical  [] Tube feeds    LABS:                        10.4   9.94  )-----------( 258      ( 2020 06:22 )             33.4     01-18    144  |  108  |  14  ----------------------------<  110<H>  4.1   |  24  |  0.78    Ca    8.2<L>      2020 06:22  Phos  3.3     01-18  Mg     1.9     18    TPro  5.9<L>  /  Alb  3.6  /  TBili  0.3  /  DBili  x   /  AST  <5<L>  /  ALT  10  /  AlkPhos  67  01-17    PT/INR - ( 2020 12:21 )   PT: 14.5 sec;   INR: 1.26          PTT - ( 2020 12:21 )  PTT:>200.0 sec        CAPILLARY BLOOD GLUCOSE      POCT Blood Glucose.: 102 mg/dL (2020 12:14)      Drug Levels: [] N/A    CSF Analysis: [] N/A      Allergies    adhesives (Rash)  penicillin (Other)  tetanus toxoid (Other)    Intolerances        Home Medications:  aspirin 81 mg oral tablet, chewable: 1 tab(s) orally once a day (2020 11:38)  escitalopram 5 mg oral tablet: 2 tab(s) orally once a day (2020 08:53)      MEDICATIONS:  Antibiotics:    Neuro:  acetaminophen   Tablet .. 650 milliGRAM(s) Oral every 6 hours PRN  escitalopram 10 milliGRAM(s) Oral daily  metoclopramide Injectable 10 milliGRAM(s) IV Push every 6 hours PRN  ondansetron Injectable 4 milliGRAM(s) IV Push every 6 hours PRN    Anticoagulation:  aspirin  chewable 81 milliGRAM(s) Oral daily    OTHER:  albuterol/ipratropium for Nebulization 3 milliLiter(s) Nebulizer every 6 hours PRN  artificial  tears Solution 1 Drop(s) Both EYES every 2 hours PRN  bisacodyl 5 milliGRAM(s) Oral at bedtime  influenza   Vaccine 0.5 milliLiter(s) IntraMuscular once  senna 2 Tablet(s) Oral at bedtime    IVF:    CULTURES:    RADIOLOGY & ADDITIONAL TESTS:      ASSESSMENT:  79y Female s/p cerebral angiogram for coil embolization of acomm aneurysm. Loaded with heparin during procedure, which was then stopped. ASA 81 started    I67.1  No pertinent family history in first degree relatives  Handoff  HLD (hyperlipidemia)  Skin cancer  Hepatitis C  Dry eyes  Glaucoma  DCIS (ductal carcinoma in situ) of breast  Kidney stone  Hiatal hernia  COPD (chronic obstructive pulmonary disease)  Paraesophageal hernia  Anterior communicating artery aneurysm  Anterior communicating artery aneurysm  Cerebral aneurysm  HLD (hyperlipidemia)  Ductal carcinoma in situ (DCIS) of left breast  Chronic hepatitis C without hepatic coma  COPD (chronic obstructive pulmonary disease)  Cerebral aneurysm without rupture  Angiogram, cerebral, with embolization  S/P cataract surgery  S/P Mohs surgery for basal cell carcinoma  S/P wrist surgery  S/P appendectomy  S/P mastectomy, left  S/P mastectomy, left        PLAN:  NEURO:  -neuro/vital checks  -groin/pulse checks  -pain control prn  -ASA 81mg     CARDIOVASCULAR:  -SBP goal normotensive    PULMONARY:  -room air, satting well     RENAL:  -IVL  -voiding    GI:  -regular diet  -bowel regimen    HEME:  -trend h/h    ID:  -afebrile     ENDO:  -ISS      DVT PROPHYLAXIS:  [x] Venodynes                                [] Heparin/Lovenox    FALL RISK:  [] Low Risk                                    [] Impulsive    DISPOSITION:   -ICU status   -Full code  -PT/OT pending   -Discussed with Dr. Ceballos and Dr. Richter       Assessment:  Present when checked    []  GCS  E   V  M     Heart Failure: []Acute, [] acute on chronic , []chronic  Heart Failure:  [] Diastolic (HFpEF), [] Systolic (HFrEF), []Combined (HFpEF and HFrEF), [] RHF, [] Pulm HTN, [] Other    [] JULITA, [] ATN, [] AIN, [] other  [] CKD1, [] CKD2, [] CKD 3, [] CKD 4, [] CKD 5, []ESRD    Encephalopathy: [] Metabolic, [] Hepatic, [] toxic, [] Neurological, [] Other    Abnormal Nurtitional Status: [] malnurtition (see nutrition note), [ ]underweight: BMI < 19, [] morbid obesity: BMI >40, [] Cachexia    [] Sepsis  [] hypovolemic shock,[] cardiogenic shock, [] hemorrhagic shock, [] neuogenic shock  [] Acute Respiratory Failure  []Cerebral edema, [] Brain compression/ herniation,   [] Functional quadriplegia  [] Acute blood loss anemia

## 2020-01-18 NOTE — DISCHARGE NOTE PROVIDER - NSDCFUADDINST_GEN_ALL_CORE_FT
ACTIVITY:     Do not drive or operate hazardous machinery for 24 hours.                        Limit your physical activities for 24 hours.                        Do not engage in in sports, heavy work or heavy lifting for 72 hours.    DIET:             You may resume your regular diet.                        Drinking extra fluids (water, juice) is encouraged.                        Abstain from alcohol for 24 hours.    HYGIENE:     Shower and take off the puncture site dressing the next morning.                        If you received a "closure device" in your groin, you may shower the next day, but not take a bath, hot tub or swim for 5    days.    PAIN MEDICATION:   A mild pain at the puncture siteis not unusual.                                        You may take Tylenol 1-2 tabs every 4-6 hours as needed, for one day.                                        If the pain persists contact the office at [phone]    SPECIAL INSTRUCTIONS:  Signs and symptoms to look out for:       1.  bleeding from the puncture site is an emergency.            Put direct pressure on the site and go directly to your local Emergency   Room for treatment.       2. Bleeding under the skin may also occur and a small "black and blue may be expected.         If there appears to be an expanding mass or swelling around the puncture site, apply manual compression and go          immediately to your local Emergency Room for treatment.       3. If your foot/leg or hand/arm (puncture site) becomes cool or blue and/or you are unable to move it, this must be treated as an   emergency.         go directly to your local emergency room for treatment.       4. Excessive puncture site pain is abnormal and should be assessed.      5.  Look out for signs of infection in the puncture site: fever, red streaking of the leg, discharge, pain.      6.  Lack of adequate urine output, provided you are drinking enough fluids, may be cause for concern, notify us if you think this is the case.

## 2020-01-18 NOTE — PHYSICAL THERAPY INITIAL EVALUATION ADULT - MODALITIES TREATMENT COMMENTS
FIM locomotion 5; FIM stairs TBA; smile symmetric; cheek puff symmetric; tongue protrusion midline, facial sensation V1-3 intact bilaterally; visual tracking WNL in all quadrants without visual field cuts or nystagmus; hearing intact to finger rub bilaterally; EOMI; PERRLA; shoulder shrug and head turning intact 5/5 bilaterally; -dysdiadochokinesia; -Babinski, -clonus, -dysarthria MRS 3

## 2020-01-18 NOTE — DISCHARGE NOTE PROVIDER - HOSPITAL COURSE
History of Present Illness:     79 y.o. female with PMH of HLD, COPD, Hepatitis C, Breast Cancer s/p chemo/RT/left mastectomy presents today for elective cerebral angiogram and possible coiling of ACOMM artery aneurysm. Patient states being previously admitted for right arm numbness and tingling one year ago with MRI Brain performed finding a 3mm ACOMM aneurysm which was observed. On repeat studying some months later the aneurysm doubled in size to 6mm as per patient. She denies any symptoms associated with the aneurysm but states chronic headaches >2 years which she attributed to Cataracts or sinus congestion. She otherwise denies visual or hearing changes, extremity weakness or numbness, gait disturbance, syncope or seizures. She denies use of Aspirin or other anticoagulation.        Patient underwent successful coil embolization of 3mm ACOMM artery aneurysm. Started on Aspirin 81mg as prevention. No perioperative complications. POD#1 had physical and occupational therapy evaluation. Cleared for discharge home. History of Present Illness:     79 y.o. female with PMH of HLD, COPD, Hepatitis C, Breast Cancer s/p chemo/RT/left mastectomy presents today for elective cerebral angiogram and possible coiling of ACOMM artery aneurysm. Patient states being previously admitted for right arm numbness and tingling one year ago with MRI Brain performed finding a 3mm ACOMM aneurysm which was observed. On repeat studying some months later the aneurysm doubled in size to 6mm as per patient. She denies any symptoms associated with the aneurysm but states chronic headaches >2 years which she attributed to Cataracts or sinus congestion. She otherwise denies visual or hearing changes, extremity weakness or numbness, gait disturbance, syncope or seizures. She denies use of Aspirin or other anticoagulation.        Patient underwent successful coil embolization of 3mm ACOMM artery aneurysm. Started on Aspirin 81mg as prevention. History of Present Illness:     79 y.o. female with PMH of HLD, COPD, Hepatitis C, Breast Cancer s/p chemo/RT/left mastectomy presents today for elective cerebral angiogram and possible coiling of ACOMM artery aneurysm. Patient states being previously admitted for right arm numbness and tingling one year ago with MRI Brain performed finding a 3mm ACOMM aneurysm which was observed. On repeat studying some months later the aneurysm doubled in size to 6mm as per patient. She denies any symptoms associated with the aneurysm but states chronic headaches >2 years which she attributed to Cataracts or sinus congestion. She otherwise denies visual or hearing changes, extremity weakness or numbness, gait disturbance, syncope or seizures. She denies use of Aspirin or other anticoagulation.        Patient underwent successful coil embolization of 3mm ACOMM artery aneurysm. Started on Aspirin 81mg as prevention, however pt refusing .  aware.        Hospital Course:     POD#0: s/p cerebral angiogram for coil embolization of acomm aneurysm. Loaded with heparin during procedure, which was then stopped. Started on ASA 81    POD#1: DESEAN overnight. Transfer to SDU today. PT/OT.     POD#2 d/c home

## 2020-01-18 NOTE — DISCHARGE NOTE PROVIDER - NSDCMRMEDTOKEN_GEN_ALL_CORE_FT
aspirin 81 mg oral tablet, chewable: 1 tab(s) orally once a day  escitalopram 5 mg oral tablet: 2 tab(s) orally once a day

## 2020-01-18 NOTE — DISCHARGE NOTE PROVIDER - NSDCFUADDAPPT_GEN_ALL_CORE_FT
Please call Opal JAMES at [phone] for your follow-up appointment date with Dr. Ceballos Please call Opal JAMES at [phone] for your follow-up appointment date with Dr. Ceballos.    Please follow-up with your primary care doctor to discuss whether you should start taking the statin medication for high cholesterol with your history of joint pains.

## 2020-01-18 NOTE — PHYSICAL THERAPY INITIAL EVALUATION ADULT - COORDINATION ASSESSED, REHAB EVAL
finger to nose/heel to shin/WNL and intact bilaterally with no nystagmus or clumsiness bilaterally WNL and intact bilaterally with no nystagmus or clumsiness bilaterally/finger to nose/heel to shin

## 2020-01-18 NOTE — PHYSICAL THERAPY INITIAL EVALUATION ADULT - PERTINENT HX OF CURRENT PROBLEM, REHAB EVAL
79 y.o. female with PMH of HLD, COPD, Hepatitis C, Breast Cancer s/p chemo/RT/left mastectomy presents today for elective cerebral angiogram and possible coiling of ACOMM artery aneurysm. Patient states being previously admitted for right arm numbness and tingling one year ago with MRI Brain performed finding a 3mm ACOMM aneurysm which was observed.

## 2020-01-18 NOTE — PROGRESS NOTE ADULT - SUBJECTIVE AND OBJECTIVE BOX
=================================  NEUROCRITICAL CARE ATTENDING NOTE  =================================    HAROLDO PEARSON   MRN-[id number]  Summary:  79y/F with dyslipidemia COPD Hep C Breast CA s/p chemoRT / mastectomy; presented with R arm numbness / tingling 1 year ago.  MRI revealed 3mm Acomm aneurysm.  On subsequent imaging, aneurysm doubled in size.  Patient now admitted for elective coiling of aneurysm. 01/17.    COURSE IN THE HOSPITAL:  01/17 admitted to [facility], s/p coiling  01/18 No significant events overnight.     Past Medical History: HLD (hyperlipidemia) Skin cancer Hepatitis C Dry eyes Glaucoma DCIS (ductal carcinoma in situ) of breast Kidney stone Hiatal hernia COPD (chronic obstructive pulmonary disease) Paraesophageal hernia  Allergies:  adhesives (Rash) penicillin (Other) tetanus toxoid (Other)   Home meds:  escitalopram 5 mg oral tablet: 2 tab(s) orally once a day    PHYSICAL EXAMINATION  T(C): 37.1 (01-18 @ 06:43), Max: 37.4 (01-17 @ 21:45)  HR: 67 (01-18 @ 07:00) (62 - 93) BP: 148/66 (01-18 @ 07:00) (114/36 - 159/76) RR: 18 (01-18 @ 07:00) (11 - 40) SpO2: 100% (01-18 @ 07:00) (84% - 100%)  NEUROLOGIC EXAMINATION:  Patient is awake, alert, fully oriented, pupils 2-3mm equal and briskly reactive to light, EOMs intact, muscle strength 5/5 on all 4s.  GENERAL: not intubated, not in cardiorespiratory distress  EENT:  anicteric  CARDIOVASCULAR: (+) S1 S2, normal rate and regular rhythm  PULMONARY: clear to auscultation bilaterally  ABDOMEN: soft, nontender with normoactive bowel sounds  EXTREMITIES: no edema  SKIN: no rash    LABS:               10.4   9.94  )-----------( 258      ( 18 Jan 2020 06:22 )             33.4     140  |  103  |  22  ----------------------------<  123<H>  4.1   |  24  |  0.80    Ca    7.9<L>      17 Jan 2020 12:20  Phos  5.5     01-17  Mg     1.8     01-17    TPro  5.9<L>  /  Alb  3.6  /  TBili  0.3  /  DBili  x   /  AST  <5<L>  /  ALT  10  /  AlkPhos  67  01-17 01-17 @ 07:01  -  01-18 @ 07:00  IN: 1950 mL / OUT: 765 mL / NET: 1185 mL    Bacteriology:  CSF studies:  EEG:  Neuroimaging:  Other imaging:    MEDICATIONS: 01-18  ASA 81mg PO daily escitalopram 10mg PO daily bisacodyl 5mg PO HS senna HS Peridex ipratropium / albuterol PRN artificial tears PRN     IV FLUIDS: NS@100cc/hr  DRIPS:  DIET: regular diet  Lines:  Drains:    Wounds:    CODE STATUS:  Full Code                       GOALS OF CARE:  aggressive                      DISPOSITION:  home

## 2020-01-18 NOTE — DISCHARGE NOTE PROVIDER - NSDCCPCAREPLAN_GEN_ALL_CORE_FT
PRINCIPAL DISCHARGE DIAGNOSIS  Diagnosis: Cerebral aneurysm  Assessment and Plan of Treatment: ACOMM artery

## 2020-01-18 NOTE — PHYSICAL THERAPY INITIAL EVALUATION ADULT - LEVEL OF INDEPENDENCE: SIT/SUPINE, REHAB EVAL
not observed, patient left sitting in bedside chair with all lines intact, +call bell, left as received, in no apparent distress, instructed to press call bell and await assistance should patient desire to get up or need anything, RN (Casi) aware

## 2020-01-18 NOTE — PROGRESS NOTE ADULT - ASSESSMENT
79y/F with  1.  unruptured anterior communicating aneurysm, s/p angio, embolization (01/17/2020, Dr. Ceballos)  2.  dyslipidemia  3.  h/o skin cancer, breast cancer  4.  nephrolithiasis  5.  dry eyes, glaucoma  6.  COPD, paraesophageal hernia, hiatal hernia  7.  Hepatitis C    PLAN:   NEURO: neurochecks q1h, PRN pain meds with Tylenol  groin checks  continue escitalopram  REHAB:  physical therapy evaluation and management - defer  EARLY MOB:  flat x 4h then HOB up    PULM:  Room air, incentive spirometry  CARDIO:  SBP goal 100-150mm Hg  ENDO:  Blood sugar goals 140-180 mg/dL  GI:  bowel regimen  DIET: advance as tolerated  RENAL:  IVF until adequate PO intake  HEM/ONC: check post-procedure Hb  VTE Prophylaxis: SCDs, no DVT chemoprophylaxis for now as patient is high risk for bleed (fresh post-procedure)  ID: afebrile, no leukocytosis  Social: will update family  DISPO PLANNING    ATTENDING ATTESTATION:  I was physically present for the key portions of the evaluation and management (E/M) service provided.  I agree with the above history, physical and plan which I have reviewed and edited where appropriate.     Patient not at high risk for neurologic deterioration / death.  Time spent on this noncritically ill patient: 45 minutes spent on total encounter, more than 50% of the visit was spent counseling and/or coordinating care by the attending physician.    Plan discussed with RN, house staff.    REVIEW OF SYSTEMS:  No headaches, no nausea or vomiting; 14 -point review of systems otherwise unremarkable. 79y/F with  1.  unruptured anterior communicating aneurysm, s/p angio, embolization (01/17/2020, Dr. Ceballos)  2.  dyslipidemia  3.  h/o skin cancer, breast cancer  4.  nephrolithiasis  5.  dry eyes, glaucoma  6.  COPD, paraesophageal hernia, hiatal hernia  7.  Hepatitis C    PLAN:   NEURO: neurochecks q4h, PRN pain meds with Tylenol  groin - no hematoma  continue escitalopram  REHAB:  physical therapy evaluation and management - defer  EARLY MOB:  OOB adair hair ambulate    PULM:  Room air, incentive spirometry  CARDIO:  SBP goal 100-150mm Hg  ENDO:  Blood sugar goals 140-180 mg/dL  GI:  bowel regimen  DIET: regular diet  RENAL:  IVL  HEM/ONC: Hb stable  VTE Prophylaxis: SCDs, no DVT chemoprophylaxis for now as patient is high risk for bleed (going home)  ID: afebrile, no leukocytosis  Social: will update family  DISPO PLANNING    ATTENDING ATTESTATION:  I was physically present for the key portions of the evaluation and management (E/M) service provided.  I agree with the above history, physical and plan which I have reviewed and edited where appropriate.     Patient not at high risk for neurologic deterioration / death.  Time spent on this noncritically ill patient: 45 minutes spent on total encounter, more than 50% of the visit was spent counseling and/or coordinating care by the attending physician.    Plan discussed with RN, house staff.    REVIEW OF SYSTEMS:  No headaches, no nausea or vomiting; 14 -point review of systems otherwise unremarkable.

## 2020-01-18 NOTE — DISCHARGE NOTE PROVIDER - NSDCFUSCHEDAPPT_GEN_ALL_CORE_FT
HAROLDO PEARSON ; 01/29/2020 ; [facility] [address] [place] [address] HAROLDO PEARSON ; 01/29/2020 ; [facility] [address] [place] [address] HAROLDO PEARSON ; 01/29/2020 ; [facility] [address] [place] [address] HAROLDO PEARSON ; 01/29/2020 ; [facility] [address] [place] [address]

## 2020-01-18 NOTE — DISCHARGE NOTE PROVIDER - NSDCACTIVITY_GEN_ALL_CORE
Walking - Indoors allowed/Do not drive or operate machinery/Stairs allowed/Walking - Outdoors allowed/No heavy lifting/straining/Showering allowed/Do not make important decisions

## 2020-01-18 NOTE — DISCHARGE NOTE PROVIDER - CARE PROVIDER_API CALL
Giovanni Ceballos)  Neurology; Vascular Neurology  [address] [place] [address]  Phone: [phone]  Fax: [phone]  Follow Up Time:

## 2020-01-19 ENCOUNTER — TRANSCRIPTION ENCOUNTER (OUTPATIENT)
Age: 80
End: 2020-01-19

## 2020-01-19 VITALS
DIASTOLIC BLOOD PRESSURE: 80 MMHG | OXYGEN SATURATION: 95 % | HEART RATE: 78 BPM | RESPIRATION RATE: 18 BRPM | SYSTOLIC BLOOD PRESSURE: 151 MMHG

## 2020-01-19 LAB
ANION GAP SERPL CALC-SCNC: 14 MMOL/L — SIGNIFICANT CHANGE UP (ref 5–17)
BUN SERPL-MCNC: 14 MG/DL — SIGNIFICANT CHANGE UP (ref 7–23)
CALCIUM SERPL-MCNC: 9.1 MG/DL — SIGNIFICANT CHANGE UP (ref 8.4–10.5)
CHLORIDE SERPL-SCNC: 104 MMOL/L — SIGNIFICANT CHANGE UP (ref 96–108)
CO2 SERPL-SCNC: 25 MMOL/L — SIGNIFICANT CHANGE UP (ref 22–31)
CREAT SERPL-MCNC: 0.75 MG/DL — SIGNIFICANT CHANGE UP (ref 0.5–1.3)
GLUCOSE SERPL-MCNC: 93 MG/DL — SIGNIFICANT CHANGE UP (ref 70–99)
HCT VFR BLD CALC: 36.8 % — SIGNIFICANT CHANGE UP (ref 34.5–45)
HGB BLD-MCNC: 11.7 G/DL — SIGNIFICANT CHANGE UP (ref 11.5–15.5)
MAGNESIUM SERPL-MCNC: 1.9 MG/DL — SIGNIFICANT CHANGE UP (ref 1.6–2.6)
MCHC RBC-ENTMCNC: 29.3 PG — SIGNIFICANT CHANGE UP (ref 27–34)
MCHC RBC-ENTMCNC: 31.8 GM/DL — LOW (ref 32–36)
MCV RBC AUTO: 92 FL — SIGNIFICANT CHANGE UP (ref 80–100)
NRBC # BLD: 0 /100 WBCS — SIGNIFICANT CHANGE UP (ref 0–0)
PHOSPHATE SERPL-MCNC: 2.5 MG/DL — SIGNIFICANT CHANGE UP (ref 2.5–4.5)
PLATELET # BLD AUTO: 284 K/UL — SIGNIFICANT CHANGE UP (ref 150–400)
POTASSIUM SERPL-MCNC: 4 MMOL/L — SIGNIFICANT CHANGE UP (ref 3.5–5.3)
POTASSIUM SERPL-SCNC: 4 MMOL/L — SIGNIFICANT CHANGE UP (ref 3.5–5.3)
RBC # BLD: 4 M/UL — SIGNIFICANT CHANGE UP (ref 3.8–5.2)
RBC # FLD: 12.9 % — SIGNIFICANT CHANGE UP (ref 10.3–14.5)
SODIUM SERPL-SCNC: 143 MMOL/L — SIGNIFICANT CHANGE UP (ref 135–145)
WBC # BLD: 7.58 K/UL — SIGNIFICANT CHANGE UP (ref 3.8–10.5)
WBC # FLD AUTO: 7.58 K/UL — SIGNIFICANT CHANGE UP (ref 3.8–10.5)

## 2020-01-19 RX ADMIN — ESCITALOPRAM OXALATE 10 MILLIGRAM(S): 10 TABLET, FILM COATED ORAL at 09:39

## 2020-01-19 NOTE — DISCHARGE NOTE NURSING/CASE MANAGEMENT/SOCIAL WORK - PATIENT PORTAL LINK FT
You can access the FollowMyHealth Patient Portal offered by [facility] by registering at the following website: [email or web address]. By joining [employer]’s FollowMyHealth portal, you will also be able to view your health information using other applications (apps) compatible with our system.

## 2020-01-19 NOTE — PROGRESS NOTE ADULT - SUBJECTIVE AND OBJECTIVE BOX
HPI:  79 y.o. female with PMH of HLD, COPD, Hepatitis C, Breast Cancer s/p chemo/RT/left mastectomy presents today for elective cerebral angiogram and possible coiling of ACOMM artery aneurysm. Patient states being previously admitted for right arm numbness and tingling one year ago with MRI Brain performed finding a 3mm ACOMM aneurysm which was observed. On repeat studying some months later the aneurysm doubled in size to 6mm as per patient. She denies any symptoms associated with the aneurysm but states chronic headaches >2 years which she attributed to Cataracts or sinus congestion. She otherwise denies visual or hearing changes, extremity weakness or numbness, gait disturbance, syncope or seizures. She denies use of Aspirin or other anticoagulation. (2020 07:27)    OVERNIGHT EVENTS: No complaints. No tremors overnight, eager to go home      Hospital Course:   POD#0: s/p cerebral angiogram for coil embolization of acomm aneurysm. Loaded with heparin during procedure, which was then stopped. Started on ASA 81  POD#1: DESEAN overnight. Transfer to SDU today. PT/OT  POD#2 d/c home    Vital Signs Last 24 Hrs  T(C): 36.9 (2020 09:00), Max: 37.3 (2020 14:54)  T(F): 98.4 (2020 09:00), Max: 99.2 (2020 14:54)  HR: 76 (2020 08:37) (64 - 84)  BP: 172/75 (2020 04:42) (127/61 - 172/75)  BP(mean): 108 (2020 04:42) (88 - 108)  RR: 18 (2020 04:42) (17 - 25)  SpO2: 94% (2020 04:42) (94% - 97%)    I&O's Summary    2020 07:01  -  2020 07:00  --------------------------------------------------------  IN: 500 mL / OUT: 1550 mL / NET: -1050 mL    2020 07:01  -  2020 10:39  --------------------------------------------------------  IN: 0 mL / OUT: 350 mL / NET: -350 mL        PHYSICAL EXAM:  Neurological:  AAOx3, NAD, coherent speech, following commands  PERRL, EOMI, face symmetric  MAEx4, strength 5/5 UE and LE b/l, no drift  SILT throughout   Extremities: distal extremities warm and well perfused, pulses intact  Incision/wound: right groin incision clean, dry and intact; no hemorrhage or hematoma  Incision/Wound:groin c/d/i    TUBES/LINES:  [] Martínez  [] Lumbar Drain  [] Wound Drains  [] Others      DIET:  [] NPO  [x] Mechanical  [] Tube feeds    LABS:                        11.7   7.58  )-----------( 284      ( 2020 06:23 )             36.8     -19    143  |  104  |  14  ----------------------------<  93  4.0   |  25  |  0.75    Ca    9.1      2020 06:23  Phos  2.5     -19  Mg     1.9         TPro  5.9<L>  /  Alb  3.6  /  TBili  0.3  /  DBili  x   /  AST  <5<L>  /  ALT  10  /  AlkPhos  67  01-17    PT/INR - ( 2020 12:21 )   PT: 14.5 sec;   INR: 1.26          PTT - ( 2020 12:21 )  PTT:>200.0 sec        CAPILLARY BLOOD GLUCOSE      POCT Blood Glucose.: 102 mg/dL (2020 12:14)      Drug Levels: [] N/A    CSF Analysis: [] N/A      Allergies    adhesives (Rash)  penicillin (Other)  tetanus toxoid (Other)    Intolerances      MEDICATIONS:  Antibiotics:    Neuro:  acetaminophen   Tablet .. 650 milliGRAM(s) Oral every 6 hours PRN  escitalopram 10 milliGRAM(s) Oral daily  metoclopramide Injectable 10 milliGRAM(s) IV Push every 6 hours PRN  ondansetron Injectable 4 milliGRAM(s) IV Push every 6 hours PRN    Anticoagulation:  aspirin  chewable 81 milliGRAM(s) Oral daily    OTHER:  albuterol/ipratropium for Nebulization 3 milliLiter(s) Nebulizer every 6 hours PRN  artificial  tears Solution 1 Drop(s) Both EYES every 2 hours PRN  bisacodyl 5 milliGRAM(s) Oral at bedtime  influenza   Vaccine 0.5 milliLiter(s) IntraMuscular once  senna 2 Tablet(s) Oral at bedtime    IVF:    CULTURES:    RADIOLOGY & ADDITIONAL TESTS:      ASSESSMENT:  79y Female s/p cerebral angiogram for coil embolization of acomm aneurysm. Loaded with heparin during procedure, which was then stopped. ASA 81 started however pt refusing.    I67.1  No pertinent family history in first degree relatives  Handoff  MEWS Score  HLD (hyperlipidemia)  Skin cancer  Hepatitis C  Dry eyes  Glaucoma  DCIS (ductal carcinoma in situ) of breast  Kidney stone  Hiatal hernia  COPD (chronic obstructive pulmonary disease)  Paraesophageal hernia  Anterior communicating artery aneurysm  Anterior communicating artery aneurysm  Cerebral aneurysm  HLD (hyperlipidemia)  Ductal carcinoma in situ (DCIS) of left breast  Chronic hepatitis C without hepatic coma  COPD (chronic obstructive pulmonary disease)  Cerebral aneurysm without rupture  Angiogram, cerebral, with embolization  S/P cataract surgery  S/P Mohs surgery for basal cell carcinoma  S/P wrist surgery  S/P appendectomy  S/P mastectomy, left  S/P mastectomy, left        PLAN:  NEURO:  NEURO:  -neuro/vital checks  -groin/pulse checks  -pain control prn  -ASA 81mg -pt refusing.  aware  -d/c home today    CARDIOVASCULAR:  -SBP goal normotensive    PULMONARY:  -room air, satting well     RENAL:  -IVL  -voiding    GI:  -regular diet  -bowel regimen    HEME:  -trend h/h    ID:  -afebrile     ENDO:  -ISS      DVT PROPHYLAXIS:  [x] Venodynes                                [] Heparin/Lovenox  DVT PROPHYLAXIS:  [] Venodynes                                [] Heparin/Lovenox    DISPOSITION:    Assessment:  Present when checked    []  GCS  E   V  M     Heart Failure: []Acute, [] acute on chronic , []chronic  Heart Failure:  [] Diastolic (HFpEF), [] Systolic (HFrEF), []Combined (HFpEF and HFrEF), [] RHF, [] Pulm HTN, [] Other    [] JULITA, [] ATN, [] AIN, [] other  [] CKD1, [] CKD2, [] CKD 3, [] CKD 4, [] CKD 5, []ESRD    Encephalopathy: [] Metabolic, [] Hepatic, [] toxic, [] Neurological, [] Other    Abnormal Nurtitional Status: [] malnurtition (see nutrition note), [ ]underweight: BMI < 19, [] morbid obesity: BMI >40, [] Cachexia    [] Sepsis  [] hypovolemic shock,[] cardiogenic shock, [] hemorrhagic shock, [] neuogenic shock  [] Acute Respiratory Failure  []Cerebral edema, [] Brain compression/ herniation,   [] Functional quadriplegia  [] Acute blood loss anemia

## 2020-01-19 NOTE — DISCHARGE NOTE NURSING/CASE MANAGEMENT/SOCIAL WORK - NSDCFUADDAPPT_GEN_ALL_CORE_FT
Please call Opal JAMES at [phone] for your follow-up appointment date with Dr. Ceballos.    Please follow-up with your primary care doctor to discuss whether you should start taking the statin medication for high cholesterol with your history of joint pains.

## 2020-01-24 DIAGNOSIS — B18.2 CHRONIC VIRAL HEPATITIS C: ICD-10-CM

## 2020-01-24 DIAGNOSIS — Z87.442 PERSONAL HISTORY OF URINARY CALCULI: ICD-10-CM

## 2020-01-24 DIAGNOSIS — Z88.7 ALLERGY STATUS TO SERUM AND VACCINE: ICD-10-CM

## 2020-01-24 DIAGNOSIS — Z90.12 ACQUIRED ABSENCE OF LEFT BREAST AND NIPPLE: ICD-10-CM

## 2020-01-24 DIAGNOSIS — I67.1 CEREBRAL ANEURYSM, NONRUPTURED: ICD-10-CM

## 2020-01-24 DIAGNOSIS — Z85.828 PERSONAL HISTORY OF OTHER MALIGNANT NEOPLASM OF SKIN: ICD-10-CM

## 2020-01-24 DIAGNOSIS — J44.9 CHRONIC OBSTRUCTIVE PULMONARY DISEASE, UNSPECIFIED: ICD-10-CM

## 2020-01-24 DIAGNOSIS — I65.22 OCCLUSION AND STENOSIS OF LEFT CAROTID ARTERY: ICD-10-CM

## 2020-01-24 DIAGNOSIS — E78.5 HYPERLIPIDEMIA, UNSPECIFIED: ICD-10-CM

## 2020-01-24 DIAGNOSIS — Z85.3 PERSONAL HISTORY OF MALIGNANT NEOPLASM OF BREAST: ICD-10-CM

## 2020-01-24 DIAGNOSIS — Z91.09 OTHER ALLERGY STATUS, OTHER THAN TO DRUGS AND BIOLOGICAL SUBSTANCES: ICD-10-CM

## 2020-01-24 DIAGNOSIS — Z88.0 ALLERGY STATUS TO PENICILLIN: ICD-10-CM

## 2020-01-29 ENCOUNTER — APPOINTMENT (OUTPATIENT)
Dept: NEUROSURGERY | Facility: CLINIC | Age: 80
End: 2020-01-29
Payer: MEDICARE

## 2020-01-29 VITALS
WEIGHT: 117 LBS | SYSTOLIC BLOOD PRESSURE: 108 MMHG | TEMPERATURE: 98.2 F | HEIGHT: 61 IN | DIASTOLIC BLOOD PRESSURE: 69 MMHG | BODY MASS INDEX: 22.09 KG/M2 | OXYGEN SATURATION: 97 % | HEART RATE: 70 BPM | RESPIRATION RATE: 18 BRPM

## 2020-01-29 PROCEDURE — 99213 OFFICE O/P EST LOW 20 MIN: CPT

## 2020-01-29 NOTE — REASON FOR VISIT
[FreeTextEntry1] : s/p femoral cerebral angiogram and endovascular embolization of anterior communicating artery aneurysm on January 17, 2020 by . She was also diagnosed with mild (40%) stenosis of the LEFT carotid artery in which daily aspirin was recommended by Dr. Ceballos but patient refused due to her previous history of GI bleed.\par \par TODAY'S VISIT:\par Patient is doing well and is back to her normal activities. She denies fever/chills, CO, unilateral leg edema, focal weakness, dysarthria, numbness/tingling. She denies angiogram groin site pain, drainage, swelling or ecchymosis.\par \par She still refuses to be on ASA and statin as recommended for the diagnosis of mild (40%) left carotid stenosis.

## 2020-01-29 NOTE — PHYSICAL EXAM
[Tender] : not tender [FreeTextEntry1] : bilateral trace ankle edema; 2+ bilateral palpable DP and PT

## 2020-01-29 NOTE — ASSESSMENT
[FreeTextEntry1] : Patient is doing well s/p cerebral angiogram and coil embolization of anterior communicating artery aneurysm on January 17, 2020. A three month follow up MRA brain is recommended to assess the coils and will return to Dr. Ceballos's office to review it. A one year follow up US carotid is also recommended to assess the left carotid stenosis. Patient was educated on the signs and symptoms of stroke.\par \par Cerebral angiogram results discussed with PCP Dr. Fountain and patient's refusal to be on ASA and statin d/t history of GI bleed. \par \par Plan:\par 1. Three month (April 2020) follow up MRA brain from time of embolization and RTO to review it.\par 2. A carotid duplex in 1 year (Jan 2021) to assess the left carotid stenosis.

## 2020-01-29 NOTE — HISTORY OF PRESENT ILLNESS
[FreeTextEntry1] : 79F never smoker with a medical history of COPD, asthma, paraesophageal hiatal hernia, GI bleed and left breast CA (on remission) s/p left mastectomy presented to the office to evaluate a known aneurysm. She was referred by her PCP, Dr. Fountain. The aneurysm was incidentally found in 2018 during a workup after experiencing RIGHT arm numbness/tingling/loss of hand  . She reported an aneurysm size of 3.3 mm in 2018 and with a growth to 6.0 mm of an anterior communicating artery aneurysm in December 2019. MRA also reported an absent flow signal within the A1 segment of the LEFT URVASHI.\par \par She has no history of HTN or family history of brain aneurysm. She denies headaches. She is not currently on ASA or blood thinners.\par \par Since the aneurysm is larger based on the MRA criteria, treatment was recommended. On January 7, 2020 she underwent femoral cerebral angiogram and endovascular embolization of anterior communicating artery aneurysm by . She was also diagnosed with mild (40%) stenosis of the LEFT carotid artery in which daily aspirin was recommended by Dr. Ceballos but patient refused due to her previous history of GI bleed.\par \par Handedness:\par \par Patient Address:\par [address]\[place] [address]\par \par Referring MD and PCP:\par [address] [address]\par \par Neurologist:\par Dr. Leo Hemphill\par [address] [facility], [address]\[place] [address]\par \par \par

## 2020-02-27 PROCEDURE — 80048 BASIC METABOLIC PNL TOTAL CA: CPT

## 2020-02-27 PROCEDURE — 85730 THROMBOPLASTIN TIME PARTIAL: CPT

## 2020-02-27 PROCEDURE — C1760: CPT

## 2020-02-27 PROCEDURE — 85610 PROTHROMBIN TIME: CPT

## 2020-02-27 PROCEDURE — 97162 PT EVAL MOD COMPLEX 30 MIN: CPT

## 2020-02-27 PROCEDURE — 36415 COLL VENOUS BLD VENIPUNCTURE: CPT

## 2020-02-27 PROCEDURE — C1894: CPT

## 2020-02-27 PROCEDURE — C1769: CPT

## 2020-02-27 PROCEDURE — 80053 COMPREHEN METABOLIC PANEL: CPT

## 2020-02-27 PROCEDURE — C1889: CPT

## 2020-02-27 PROCEDURE — C1887: CPT

## 2020-02-27 PROCEDURE — 97116 GAIT TRAINING THERAPY: CPT

## 2020-02-27 PROCEDURE — 85025 COMPLETE CBC W/AUTO DIFF WBC: CPT

## 2020-02-27 PROCEDURE — 82962 GLUCOSE BLOOD TEST: CPT

## 2020-02-27 PROCEDURE — 84100 ASSAY OF PHOSPHORUS: CPT

## 2020-02-27 PROCEDURE — 83735 ASSAY OF MAGNESIUM: CPT

## 2020-02-27 PROCEDURE — 85027 COMPLETE CBC AUTOMATED: CPT

## 2020-06-16 PROBLEM — E78.5 HYPERLIPIDEMIA, UNSPECIFIED: Chronic | Status: ACTIVE | Noted: 2020-01-17

## 2020-07-02 ENCOUNTER — OUTPATIENT (OUTPATIENT)
Dept: OUTPATIENT SERVICES | Facility: HOSPITAL | Age: 80
LOS: 1 days | End: 2020-07-02

## 2020-07-02 ENCOUNTER — APPOINTMENT (OUTPATIENT)
Dept: MRI IMAGING | Facility: CLINIC | Age: 80
End: 2020-07-02
Payer: MEDICARE

## 2020-07-02 DIAGNOSIS — O03.9 COMPLETE OR UNSPECIFIED SPONTANEOUS ABORTION WITHOUT COMPLICATION: Chronic | ICD-10-CM

## 2020-07-02 DIAGNOSIS — Z98.890 OTHER SPECIFIED POSTPROCEDURAL STATES: Chronic | ICD-10-CM

## 2020-07-02 DIAGNOSIS — Z90.12 ACQUIRED ABSENCE OF LEFT BREAST AND NIPPLE: Chronic | ICD-10-CM

## 2020-07-02 DIAGNOSIS — Z98.49 CATARACT EXTRACTION STATUS, UNSPECIFIED EYE: Chronic | ICD-10-CM

## 2020-07-02 DIAGNOSIS — Z90.49 ACQUIRED ABSENCE OF OTHER SPECIFIED PARTS OF DIGESTIVE TRACT: Chronic | ICD-10-CM

## 2020-07-02 PROCEDURE — 70544 MR ANGIOGRAPHY HEAD W/O DYE: CPT | Mod: 26

## 2020-07-07 ENCOUNTER — APPOINTMENT (OUTPATIENT)
Dept: NEUROSURGERY | Facility: CLINIC | Age: 80
End: 2020-07-07
Payer: MEDICARE

## 2020-07-07 PROCEDURE — 99214 OFFICE O/P EST MOD 30 MIN: CPT

## 2020-07-07 NOTE — ASSESSMENT
[FreeTextEntry1] : An MRA of the brain from July 2, 2020 didn’t show recanalization of the embolized aneurysm.  A follow up MRA is recommended in 6 months.  At that time we will also obtain a carotid duplex and she will follow up in Dr. Ceballos's office.\par \par \par Plan:\par 1. Repeat MRA head w/o contrast in 6 months (January 2021) to re-assess coiled anuerysm.\par 2. Carotid duplex in Jan 2021 to assess left carotid stenosis

## 2020-07-07 NOTE — REASON FOR VISIT
[Follow-Up: _____] : a [unfilled] follow-up visit [FreeTextEntry1] : LAST VISIT on 1/29/2020\par She still refuses to be on ASA and statin as recommended for the diagnosis of mild (40%) left carotid stenosis. \par Instructed to have a 3 month follow up t MRA in April 2020 s/p embolization and to obtain carotid duplex in Jan 2021 to assess left carotid stenosis.\par \par TODAY'S VISIT:\par She reports vertigo which was evaluated by Dr. Fountain to be positional vertigo. She denies new onset severe HA.\par

## 2020-07-07 NOTE — DATA REVIEWED
[de-identified] : [facility]\par    [facility] Imaging at [facility] Department of Radiology\par   Radiology Report\par \par \par Patient Name: LILI ZARCO   Report Date: 02-Jul-2020 14:59.00 \par Patient ID: [id number] ([id number]), [id number] (EPI)  Accession No.: [id number] \par Patient Birth Date: 1940  Report Status: F \par Referring Physician: [id number] ESPERANZA MERIDA   Reason For Study: I67.1  \par \par \par \par \par \par \par \par \par \par EXAM: MR ANGIO BRAIN \par \par PROCEDURE DATE: 07/02/2020 \par \par \par \par \par INTERPRETATION: PROCEDURE: MRA brain without contrast. \par \par INDICATION: Anterior communicating artery aneurysm status post coil \par embolization. \par \par TECHNIQUE: The MRA of the [place] was performed utilizing 3D TOF \par technique. Rotational MIP series are provided. Limited imaging of the brain \par includes axial T2-FLAIR and diffusion imaging. \par \par COMPARISON: MRI brain 3/22/2018 and outside institution. Diagnostic cerebral \par angiogram 1/17/2020. \par \par FINDINGS: \par \par Patient status post coil embolization of anterior communicating artery \par complex aneurysm. There is prominence of the right A1 A2 junction along the \par medial aspect of the coil mass (series 5 image 85). There is no definite \par flow-related signal within the aneurysm sac. No new aneurysm. \par \par There is flow-related signal within the distal bilateral internal carotid \par arteries without significant stenosis. The left A1 segment is not \par visualized. The right A1 segment and bilateral A2 segments demonstrate \par normal flow-related signal without significant stenosis. There is normal \par flow-related signal within the bilateral middle cerebral arteries. There is \par infundibular origin of the right posterior communicating artery. \par \par There is flow-related signal within the distal bilateral vertebral arteries, \par basilar artery, bilateral superior cerebellar and bilateral posterior \par cerebral arteries without high-grade stenosis areas the bilateral posterior \par communicating arteries are visualized. \par \par Diffusion imaging is negative for recent infarction injury. T2-FLAIR imaging \par shows the ventricles and sulci are prominent consistent with parenchymal \par volume loss. There are scattered foci of T2/FLAIR hyperintensity within the \par cerebral white matter likely on the basis of long-standing microvascular \par ischemic disease. There is a dilated perivascular space within the inferior \par left basal ganglia. \par \par IMPRESSION: Status post coil embolization anterior communicating artery \par complex aneurysm. Prominence of the right A1 A2 junction along the medial \par aspect of the coil mass which on cerebral angiogram appears to be separate \par from the aneurysm sac. No definite flow related signal within the aneurysm \par sac. No new aneurysm. \par \par \par \par \par \par \par \par \par GO JOHNSTON M.D., ATTENDING RADIOLOGIST \par This document has been electronically signed. Jul 2 2020 2:59PM \par \par \par  \par

## 2020-07-07 NOTE — HISTORY OF PRESENT ILLNESS
[Home] : at home, [unfilled] , at the time of the visit. [Medical Office: ([facility])___] : at the medical office located in  [Verbal consent obtained from patient] : the patient, [unfilled] [FreeTextEntry1] : 79F never smoker with a medical history of COPD, asthma, paraesophageal hiatal hernia, GI bleed and left breast CA (on remission) s/p left mastectomy presented to the office to evaluate a known aneurysm. She was referred by her PCP, Dr. Fountain. The aneurysm was incidentally found in 2018 during a workup after experiencing RIGHT arm numbness/tingling/loss of hand  . She reported an aneurysm size of 3.3 mm in 2018 and with a growth to 6.0 mm of an anterior communicating artery aneurysm in December 2019. MRA also reported an absent flow signal within the A1 segment of the LEFT URVASHI. She has no history of HTN or family history of brain aneurysm.\par \par On January 7, 2020 she underwent femoral cerebral angiogram and endovascular embolization of anterior communicating artery aneurysm by . She was also diagnosed with mild (40%) stenosis of the LEFT carotid artery in which daily aspirin was recommended by Dr. Ceballos but patient refused due to her previous history of GI bleed.\par \par Handedness:\par \par Patient Address:\par [address]\[place] [address]\par \par Referring MD and PCP:\par [address] [address]\par \par Neurologist:\par Dr. Leo Hemphill\par [address] [facility], [address]\[place] [address]\par \par \par \par

## 2020-07-07 NOTE — ADDENDUM
[FreeTextEntry1] : 2020\par \par Pato Fountain MD\par [address]\[place] [address]\par \par Patient name:  Miri Ruiz\par : 1940\par \par Dear Dr. Fountain:\par \par We had a tele health consult with Mrs. Ruiz from our office at [facility].  As you know, she is a 80 years-old woman with history of cigarette smoking (quit in ) and COPD, asthma, hiatal hernia, left breast cancer on remission status post left mastectomy who was diagnosed with an incidental aneurysm of the anterior communicating complex in 2018 on an MRA of the brain obtained as part of the work up for transient right hand weakness and numbness.  The aneurysm was measured to be 3.3 mm in size and has been followed conservatively since then.  A follow up MRA of the brain from 2019 suggested aneurysm growth to 6 mm.  She doesn’t have family history of aneurysms or hypertension.  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  She was also diagnosed with mild (40 percent) stenosis at the origin of the left ICA.  \par \par An MRA of the brain from 2020 didn’t show recanalization of the embolized aneurysm.  A follow up MRA is recommended in 6 months.  At that time we will also obtain a carotid duplex and she will follow up in my office.\par \par Thank you for allowing us to be part of Mrs. Ruiz’s care.  I will keep you updated at all times.\par \par Sincerely,\par \par \par Giovanni Ceballos MD\par Chief\par Neuro-Endovascular Surgery and \par Interventional Neuroradiology \par [facility]\par [facility]\par [address]\par [address] Floor\[place] [address]\par T:  [phone]\par F:  [phone]\par \par cc:	Leo Hemphill MD\par 	[address] [facility]\par 	[address]\[place] 	[place] [address]\par \par 	Mrs. Elina Ruiz\par 	[address]\par 	[address]\[place] [address]\par \par

## 2021-02-01 ENCOUNTER — NON-APPOINTMENT (OUTPATIENT)
Age: 81
End: 2021-02-01

## 2021-02-02 ENCOUNTER — NON-APPOINTMENT (OUTPATIENT)
Age: 81
End: 2021-02-02

## 2021-02-08 ENCOUNTER — APPOINTMENT (OUTPATIENT)
Dept: VASCULAR SURGERY | Facility: CLINIC | Age: 81
End: 2021-02-08
Payer: MEDICARE

## 2021-02-08 PROCEDURE — 93880 EXTRACRANIAL BILAT STUDY: CPT

## 2021-02-08 PROCEDURE — 99072 ADDL SUPL MATRL&STAF TM PHE: CPT

## 2021-02-10 ENCOUNTER — APPOINTMENT (OUTPATIENT)
Dept: MRI IMAGING | Facility: HOSPITAL | Age: 81
End: 2021-02-10
Payer: MEDICARE

## 2021-02-10 ENCOUNTER — OUTPATIENT (OUTPATIENT)
Dept: OUTPATIENT SERVICES | Facility: HOSPITAL | Age: 81
LOS: 1 days | End: 2021-02-10
Payer: MEDICARE

## 2021-02-10 ENCOUNTER — APPOINTMENT (OUTPATIENT)
Dept: ULTRASOUND IMAGING | Facility: HOSPITAL | Age: 81
End: 2021-02-10
Payer: MEDICARE

## 2021-02-10 DIAGNOSIS — Z90.12 ACQUIRED ABSENCE OF LEFT BREAST AND NIPPLE: Chronic | ICD-10-CM

## 2021-02-10 DIAGNOSIS — Z98.49 CATARACT EXTRACTION STATUS, UNSPECIFIED EYE: Chronic | ICD-10-CM

## 2021-02-10 DIAGNOSIS — Z98.890 OTHER SPECIFIED POSTPROCEDURAL STATES: Chronic | ICD-10-CM

## 2021-02-10 DIAGNOSIS — Z90.49 ACQUIRED ABSENCE OF OTHER SPECIFIED PARTS OF DIGESTIVE TRACT: Chronic | ICD-10-CM

## 2021-02-10 DIAGNOSIS — O03.9 COMPLETE OR UNSPECIFIED SPONTANEOUS ABORTION WITHOUT COMPLICATION: Chronic | ICD-10-CM

## 2021-02-10 PROCEDURE — 70544 MR ANGIOGRAPHY HEAD W/O DYE: CPT | Mod: 26

## 2021-02-10 PROCEDURE — 70544 MR ANGIOGRAPHY HEAD W/O DYE: CPT

## 2021-02-16 ENCOUNTER — APPOINTMENT (OUTPATIENT)
Dept: NEUROSURGERY | Facility: CLINIC | Age: 81
End: 2021-02-16
Payer: MEDICARE

## 2021-02-16 PROCEDURE — 99072 ADDL SUPL MATRL&STAF TM PHE: CPT

## 2021-02-16 PROCEDURE — 99213 OFFICE O/P EST LOW 20 MIN: CPT

## 2021-02-16 NOTE — DATA REVIEWED
[de-identified] : [facility]\par    [facility] Department of Radiology\par   Radiology Report\par \par \par Patient Name: LILI ZARCO   Report Date: 10-Feb-2021 15:06.00 \par Patient ID: [id number] ([id number]), [id number] (EPI)  Accession No.: [id number] \par Patient Birth Date: 1940  Report Status: F \par Referring Physician: [id number] ESPERANZA MERIDA   Reason For Study: I67.1  \par \par \par \par \par \par EXAM: MR ANGIO BRAIN\par \par PROCEDURE DATE: 02/10/2021\par \par \par \par INTERPRETATION: PROCEDURE: MRA brain without contrast.\par \par INDICATION: Follow-up status post coil embolization of anterior communicating artery aneurysm\par \par TECHNIQUE: The MRA of the [place] was performed utilizing 3D TOF technique. Rotational MIP series are provided. Limited imaging of the brain includes axial T2-FLAIR and diffusion imaging.\par \par COMPARISON: MRA brain 7/2/2020, diagnostic cerebral and 1/17/2020\par \par FINDINGS:\par \par Again demonstrated status post coil embolization of anterior communicating artery complex aneurysm. Again demonstrated is a dysplastic appearance of the right A1 A2 junction with prominent flow related signal. There is no definite flow-related signal within the aneurysm sac which is similar to prior examination.\par \par There is no new aneurysm.\par \par There is flow-related signal within the distal bilateral vertebral arteries, basilar artery, bilateral superior cerebellar and posterior cerebral arteries without significant stenosis. There is flow-related signal within the distal bilateral internal carotid arteries, bilateral anterior middle cerebral arteries. The left A1 segment is hypoplastic.\par \par Diffusion imaging is negative for recent infarction injury. Again demonstrated are scattered foci of T2/FLAIR hyperintensity within the periventricular and subcortical white matter on the basis of chronic microvascular ischemic changes. No extra-axial fluid collection, mass effect or midline shift.\par \par IMPRESSION: Status post coil embolization anterior commuting artery complex aneurysm. Dysplastic appearance of the right A1 A2 junction with prominent flow related signal. No definite flow-related signal within the aneurysm sac which is similar to prior examination.\par \par \par \par \par Thank you for the opportunity to participate in the care of this patient.\par \par \par GO JOHNSTON MD; Attending Radiologist\par This document has been electronically signed. Feb 10 2021 3:06PM \par  [de-identified] : 2/8/21 US carotid: GABBY with <50% stenosis. LICA with 50-69% stenosis.

## 2021-02-16 NOTE — ASSESSMENT
[FreeTextEntry1] : Today we reviewed an MRA of the brain from February 10, 2021 and compared with the study from July 2, 2020.  There is no recanalization of the embolized aneurysm.  A follow up MRA is recommended in 1 year.  A carotid duplex suggested 59 percent stenosis of the carotid artery and she is asymptomatic from it.  We are recommending daily baby aspirin and statin but she prefers not to take the aspirin as she has history of bleeding problems.  A follow up duplex is recommended in 1 year.  She will follow up with us at that time.\par \par \par Plan:\par 1. Repeat MRA head x 1 yr  to check treated aneurysm.\par 2. Repeat US carotid to check bilateral carotid stenosis.

## 2021-02-16 NOTE — REVIEW OF SYSTEMS
[As Noted in HPI] : as noted in HPI [Nosebleeds] : nosebleeds [Negative] : Gastrointestinal [FreeTextEntry6] : worsening asthma

## 2021-02-16 NOTE — REASON FOR VISIT
[Follow-Up: _____] : a [unfilled] follow-up visit [FreeTextEntry1] : LAST VISIT: \par She denies new onset severe HA.\par \par \par TODAY'S VISIT:\kevin Had three nosebleeds in late January. 2 out of 3 nosebleeds were nathan red blood, which lasted 1 minute-2 minutes per episode. She stated this is unusual for her. She is currently not on ASA/Plavix or anticoagulants.\par \par She denies new focal weakness, numbness/tingling, double vision.\par

## 2021-02-16 NOTE — ADDENDUM
[FreeTextEntry1] : 2021\par \par Pato Fountain MD\par [address]\[place] [address]\par \par Patient name:  Miri Ruiz\par : 1940\par \par Dear Dr. Fountain:\par \par We spoke with Mrs. Ruiz in tele-health consultation at [facility].  As you know, she is an 81 years-old woman with history of cigarette smoking (quit in ) and COPD, asthma, hiatal hernia, left breast cancer on remission status post left mastectomy who was diagnosed with an incidental aneurysm of the anterior communicating complex in 2018 on an MRA of the brain obtained as part of the work up for transient right hand weakness and numbness.  The aneurysm was measured to be 3.3 mm in size and had been followed conservatively.  A follow up MRA of the brain from 2019 suggested aneurysm growth to 6 mm.  She doesn’t have family history of aneurysms or hypertension.  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  She was also diagnosed with mild (40 percent) stenosis at the origin of the left ICA.  \par \par Today we reviewed an MRA of the brain from February 10, 2021 and compared with the study from 2020.  There is no recanalization of the embolized aneurysm.  A follow up MRA is recommended in 1 year.  A carotid duplex suggested 59 percent stenosis of the carotid artery and she is asymptomatic from it.  We are recommending daily baby aspirin and statin but she prefers not to take the aspirin as she has history of bleeding problems.  A follow up duplex is recommended in 1 year.  She will follow up with us at that time.\par \par Thank you for allowing us to be part of Mrs. Ruiz’s care.  I will keep you updated at all times.\par \par Sincerely,\par \par \par Giovanni Ceballos MD\par Chief\par Neuro-Endovascular Surgery and \par Interventional Neuroradiology \par [facility]\par \par [facility]\par [address]\par [address] Floor\[place] [address]\par T:  [phone]\par F:  [phone]\par \par cc:	Leo Hempihll MD\par 	[address] [facility]\par 	[address]\[place] 	[place] [address]\par \par 	Mrs. Elina Ruiz\par 	[address]\par 	[address]\[place] 	[place] [address]\par \par \par \par

## 2021-02-16 NOTE — HISTORY OF PRESENT ILLNESS
[Home] : at home, [unfilled] , at the time of the visit. [Medical Office: ([facility])___] : at the medical office located in  [Verbal consent obtained from patient] : the patient, [unfilled] [FreeTextEntry1] : \par  January 7, 2020 she underwent femoral cerebral angiogram and endovascular embolization of anterior communicating artery aneurysm by . She was also diagnosed with mild (40%) stenosis of the LEFT carotid artery in which daily aspirin was recommended by Dr. Ceballos but patient refused due to her previous history of GI bleed.\par \par Handedness:\par \par Patient Address:\par [address]\[place] [address]\par \par Referring MD and PCP:\par [address] [address]\par \par Neurologist:\par Dr. Leo Hemphill\par [address], [address]\[place] [address]\par \par

## 2021-03-11 PROBLEM — K75.9 HEPATITIS: Status: RESOLVED | Noted: 2019-12-23 | Resolved: 2021-03-11

## 2021-04-09 ENCOUNTER — LABORATORY RESULT (OUTPATIENT)
Age: 81
End: 2021-04-09

## 2021-04-12 ENCOUNTER — APPOINTMENT (OUTPATIENT)
Dept: PULMONOLOGY | Facility: CLINIC | Age: 81
End: 2021-04-12
Payer: MEDICARE

## 2021-04-12 ENCOUNTER — NON-APPOINTMENT (OUTPATIENT)
Age: 81
End: 2021-04-12

## 2021-04-12 VITALS
HEART RATE: 68 BPM | DIASTOLIC BLOOD PRESSURE: 70 MMHG | HEIGHT: 61 IN | BODY MASS INDEX: 23.03 KG/M2 | SYSTOLIC BLOOD PRESSURE: 120 MMHG | OXYGEN SATURATION: 94 % | TEMPERATURE: 98.3 F | WEIGHT: 122 LBS

## 2021-04-12 DIAGNOSIS — M41.9 SCOLIOSIS, UNSPECIFIED: ICD-10-CM

## 2021-04-12 PROCEDURE — 99205 OFFICE O/P NEW HI 60 MIN: CPT | Mod: 25

## 2021-04-12 PROCEDURE — 94060 EVALUATION OF WHEEZING: CPT

## 2021-04-12 PROCEDURE — 99072 ADDL SUPL MATRL&STAF TM PHE: CPT

## 2021-04-12 PROCEDURE — 94729 DIFFUSING CAPACITY: CPT

## 2021-04-12 PROCEDURE — 95012 NITRIC OXIDE EXP GAS DETER: CPT

## 2021-04-12 PROCEDURE — 71046 X-RAY EXAM CHEST 2 VIEWS: CPT

## 2021-04-12 PROCEDURE — 94727 GAS DIL/WSHOT DETER LNG VOL: CPT

## 2021-04-12 RX ORDER — SODIUM FLUORIDE 6 MG/ML
1.1 PASTE DENTAL
Qty: 100 | Refills: 0 | Status: ACTIVE | COMMUNITY
Start: 2021-03-23

## 2021-04-12 RX ORDER — ACYCLOVIR 50 MG/G
5 OINTMENT TOPICAL
Qty: 30 | Refills: 0 | Status: ACTIVE | COMMUNITY
Start: 2020-10-06

## 2021-04-19 NOTE — CONSULT LETTER
[Dear  ___] : Dear  [unfilled], [( Thank you for referring [unfilled] for consultation for _____ )] : Thank you for referring [unfilled] for consultation for [unfilled] [Please see my note below.] : Please see my note below. [Consult Closing:] : Thank you very much for allowing me to participate in the care of this patient.  If you have any questions, please do not hesitate to contact me. [Sincerely,] : Sincerely, [FreeTextEntry3] : Rosina Bauman MD, FCCP\par  [FreeTextEntry2] : Pato Fountain, DO\par [address] \[place] [address]

## 2021-04-19 NOTE — ASSESSMENT
[FreeTextEntry1] : Data reviewed:\par \par PA/lat CXR in office 04/12/2021 : severe scoliosis, clear lungs, no effusion\par \par Elijah 04/12/2021 : severe restriction / FENO 8\par PFT 4/12/21: severe EAO w sig response to BD (38-->39%), DLCO 44%\par \par Impression:\par Severe COPD, very symptomatic\par Scoliosis, age 81\par \par Plan:\par Start Trelegy or covered triple regimen.\par Return 6-8 weeks to reassess.\par Would benefit from pulm rehab but needs to get Covid vax first, rec any vaccine, but given her concerns about reaction to the vaccine reasonable to get J&J preferentially if available.\par Discussed that there may be a limit to  how good we can make her at 81 w musculoskeletal disease, but I do think optimal inhaler therapy is likely to provide a benefit here.\par --\par 4/19 Answered her questions about Trelegy. She has found it to be a "game changer" and she is so much happier on it, breathing so much better.

## 2021-04-19 NOTE — PHYSICAL EXAM
[No Acute Distress] : no acute distress [Normal Appearance] : normal appearance [Normal Rate/Rhythm] : normal rate/rhythm [Normal S1, S2] : normal s1, s2 [No Murmurs] : no murmurs [No Resp Distress] : no resp distress [Clear to Auscultation Bilaterally] : clear to auscultation bilaterally [Benign] : benign [No Clubbing] : no clubbing [No Edema] : no edema [Normal Affect] : normal affect [TextBox_99] : scoliosis

## 2021-04-19 NOTE — HISTORY OF PRESENT ILLNESS
[TextBox_4] : 04/12/2021: Asked to evaluate patient by Dr Fountain for dyspnea.  She was given a dx of asthma w COPD in 1991. This was mostly manageable w Ventolin. Symptoms have worsened w age. Very reactive to odors, perfumes etc. Can't run for a bus. Now she is triggered all the time from cleaning products during the pandemic, cab air fresheners. Gasping on a block. Quit smoking 1975 after 20 years up to 3ppd. Never had asthma but often bronchitis. Does have allergies. Is only on Ventolin now, 4-5x a week, w relief. [address], nothing wrong in apartment, no pets. 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 L mastectomy for cancer w chemo and RT ~7 years ago. Had HH repair. 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

## 2021-06-02 ENCOUNTER — APPOINTMENT (OUTPATIENT)
Dept: PULMONOLOGY | Facility: CLINIC | Age: 81
End: 2021-06-02
Payer: MEDICARE

## 2021-06-02 VITALS
DIASTOLIC BLOOD PRESSURE: 69 MMHG | SYSTOLIC BLOOD PRESSURE: 120 MMHG | TEMPERATURE: 96.3 F | OXYGEN SATURATION: 97 % | WEIGHT: 122 LBS | HEART RATE: 71 BPM | HEIGHT: 61 IN | BODY MASS INDEX: 23.03 KG/M2

## 2021-06-02 DIAGNOSIS — Z23 ENCOUNTER FOR IMMUNIZATION: ICD-10-CM

## 2021-06-02 PROCEDURE — 99214 OFFICE O/P EST MOD 30 MIN: CPT | Mod: 25

## 2021-06-02 PROCEDURE — 90670 PCV13 VACCINE IM: CPT

## 2021-06-02 PROCEDURE — G0009: CPT

## 2021-06-02 PROCEDURE — 94010 BREATHING CAPACITY TEST: CPT

## 2021-06-02 NOTE — PHYSICAL EXAM
[No Acute Distress] : no acute distress [Normal Rate/Rhythm] : normal rate/rhythm [Normal S1, S2] : normal s1, s2 [No Murmurs] : no murmurs [No Resp Distress] : no resp distress [Clear to Auscultation Bilaterally] : clear to auscultation bilaterally [TextBox_44] : no stridor

## 2021-06-02 NOTE — HISTORY OF PRESENT ILLNESS
[TextBox_4] : 04/12/2021: Asked to evaluate patient by Dr Fountain for dyspnea.  She was given a dx of asthma w COPD in 1991. This was mostly manageable w Ventolin. Symptoms have worsened w age. Very reactive to odors, perfumes etc. Can't run for a bus. Now she is triggered all the time from cleaning products during the pandemic, cab air fresheners. Gasping on a block. Quit smoking 1975 after 20 years up to 3ppd. Never had asthma but often bronchitis. Does have allergies. Is only on Ventolin now, 4-5x a week, w relief. Lives [address], nothing wrong in apartment, no pets. 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 L mastectomy for cancer w chemo and RT ~7 years ago. Had HH repair. 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 Her ability to do multiple small household chores is dramatically different. Has not needed Ventolin. Relates an experience a couple of years ago of extreme dyspnea after exposure to a nik shop, and then again about 2 mos ago in an MRI machine where she felt like no air was moving (though unclear, maybe able to speak), had to be removed from machine, maybe lasted one minute? And now needs another MRI and will not get back in machine. And again had similar experience this morning after or as using her Trelegy, and heard a terrible sound (which she replicates as a harsh inspiratory sound). Does have GERD, HH repair. Is currently seeing GI for eval for hemangioma of liver. Had Covid vax, Moderna, last dose 16 days ago.\par

## 2021-06-02 NOTE — CONSULT LETTER
[Dear  ___] : Dear  [unfilled], [Courtesy Letter:] : I had the pleasure of seeing your patient, [unfilled], in my office today. [Please see my note below.] : Please see my note below. [Consult Closing:] : Thank you very much for allowing me to participate in the care of this patient.  If you have any questions, please do not hesitate to contact me. [Sincerely,] : Sincerely, [FreeTextEntry2] : Pato Fountain, DO\par [address] \[place] [address] [FreeTextEntry3] : Rosina Bauman MD, FCCP\par

## 2021-06-02 NOTE — ASSESSMENT
[FreeTextEntry1] : Data reviewed:\par \par PA/lat CXR in office 04/12/2021 : severe scoliosis, clear lungs, no effusion\par \par Elijah 04/12/2021 : severe restriction, FEV1 37% / FENO 8\par PFT 4/12/21: severe EAO w sig response to BD (38-->49%), DLCO 44%\par Elijah 6/2/21: mod obstruction, FEV1 61%\par \par Impression:\par Severe COPD, very symptomatic, much improved on Trelegy\par Scoliosis, age 81\par Laryngospasm?\par \par Plan:\par Markedly improved on Trelegy and very happy.\par Is she having laryngospasm? Will ask ENT to evaluate.\par [id number] given today.\par See me in fall, flu vaccine at that time.\par [id number] 6-12 mos.

## 2021-06-21 ENCOUNTER — APPOINTMENT (OUTPATIENT)
Dept: OTOLARYNGOLOGY | Facility: CLINIC | Age: 81
End: 2021-06-21
Payer: MEDICARE

## 2021-06-21 VITALS
SYSTOLIC BLOOD PRESSURE: 121 MMHG | BODY MASS INDEX: 23.03 KG/M2 | DIASTOLIC BLOOD PRESSURE: 69 MMHG | OXYGEN SATURATION: 96 % | TEMPERATURE: 97.6 F | HEIGHT: 61 IN | WEIGHT: 122 LBS | HEART RATE: 62 BPM

## 2021-06-21 DIAGNOSIS — J45.909 UNSPECIFIED ASTHMA, UNCOMPLICATED: ICD-10-CM

## 2021-06-21 DIAGNOSIS — J38.7 OTHER DISEASES OF LARYNX: ICD-10-CM

## 2021-06-21 DIAGNOSIS — R49.8 OTHER VOICE AND RESONANCE DISORDERS: ICD-10-CM

## 2021-06-21 DIAGNOSIS — J38.5 LARYNGEAL SPASM: ICD-10-CM

## 2021-06-21 PROCEDURE — 31579 LARYNGOSCOPY TELESCOPIC: CPT

## 2021-06-21 PROCEDURE — 99204 OFFICE O/P NEW MOD 45 MIN: CPT | Mod: 25

## 2021-06-23 PROBLEM — J38.7 PRESBYLARYNGES: Status: ACTIVE | Noted: 2021-06-23

## 2021-06-23 PROBLEM — R49.8 PRESBYPHONIA: Status: ACTIVE | Noted: 2021-06-23

## 2021-06-23 PROBLEM — J38.5 LARYNGOSPASM: Status: ACTIVE | Noted: 2021-06-23

## 2021-06-23 NOTE — PROCEDURE
[de-identified] : Flexible Laryngoscopy with Stroboscopy \par Procedure Note\par \par Pre-operative Diagnosis: laryngospasm\par Post-operative Diagnosis: mild laryngopharyngeal reflux, presbyphonia/presbylarynges\par Anesthesia: Topical - 1 % Lidocaine/Phenylephrine\par Procedure:  Flexible Laryngoscopy with Stroboscopy\par  \par Procedure Details:  \par The patient was placed in the sitting position.  After decongestant and anesthesia were applied the laryngoscope was passed.  The nasal cavities, nasopharynx, oropharynx, hypopharynx, and larynx were all examined.  Vocal folds were examined during respiration and phonation.  The following findings were noted:\par \par Findings:  \par Nose: Septum is midline, turbinates are normal, nasal airways patent, mucosa normal\par Nasopharynx: Adenoids normal, no masses, eustachian tube normal\par Oropharynx: Pharyngeal walls symmetric and without lesion. Tonsils/fossae symmetric\par Hypopharynx: Hypopharynx and pyriform sinuses without lesion. No masses or asymmetry.  No pooling of secretions.\par Larynx:  Epiglottis and aryepiglottic folds were sharp and crisp bilaterally.  Bilateral false vocal folds normal appearance. Airway was widely patent.\par \par Strobe Exam Ratings\par 		\par TVF Appearance: bilateral vocal fold bowing\par TVF Mobility: normal\par Edema/hypertrophy: +post cricoid\par Mucus on TVF: mild\par Glottic Closure: +glottic gap\par Mucosal Wave: normal\par Amplitude of Vibration: normal\par Phase: symmetric\par Supraglottic Hyperfunction: +supraglottic compression\par Other Findings: none\par \par Condition: Stable.  Patient tolerated procedure well.\par \par Complications: None\par

## 2021-06-23 NOTE — PHYSICAL EXAM
[Midline] : trachea located in midline position [Normal] : no rashes [de-identified] : +bilateral cerumen impaction

## 2021-06-23 NOTE — CONSULT LETTER
[Dear  ___] : Dear  [unfilled], [Consult Letter:] : I had the pleasure of evaluating your patient, [unfilled]. [Please see my note below.] : Please see my note below. [Consult Closing:] : Thank you very much for allowing me to participate in the care of this patient.  If you have any questions, please do not hesitate to contact me. [Sincerely,] : Sincerely, [DrJuan  ___] : Dr. YANEZ [FreeTextEntry3] : Hernan Luu MD\par Director; [facility]\par Otolaryngology - Head and Neck Surgery\par [facility]\par \par \par Department of Otolaryngology\par [facility]\par \par [address]\par [facility], [address]\[place], [place], [address]\par Office Tel: [phone]\par \par \par

## 2021-06-23 NOTE — ASSESSMENT
[FreeTextEntry1] : 81 female with asthma who presents with concern for episodes of asthma exacerbation and possible laryngospasm.  On exam today there is mild evidence of reflux, as well as evidence of presbylarynges.  At this time I am recommending dietary and behavioral change to reduce acid in the diet, but no new medication.  We also discussed cough avoidance and continued hydration.  I also believe she would benefit from consultation with speech therapy, both to improve voice for presbyphonia as well as discuss breathing techniques for for potential laryngospasm episodes.  Pt to also continue follow up with Dr. Bauman.  Follow up with me in 2-3 months, sooner should symptoms worsen or fail to improve.\par \par - Dietary and behavioral modification to reduce acid reflux, handout given\par - Voice hygiene, increase hydration, sips of water throughout the day, avoid throat clearing\par - consult SpP\par - cont followup with Dr. Bauman\par - fu with me 2-3 mo\par

## 2021-06-23 NOTE — HISTORY OF PRESENT ILLNESS
[de-identified] : 80 yo female who presents at the request of Dr. Bauman for evaluation of laryngospasm.  She notes a long hx of asthma that has been managed with Ventolin for years.  Over the past few months however she has had episodes of sob/dyspnea that would be brought on be strong smells, odors, perfumes, and "thick air."  Her inhaler has not been as effective, she feels that she cannot bring air into her lungs and it can take several minutes to resolve.  These episodes have been exacerbated over the course of the pandemic secondary to cleaning products.  One episode during an MRI was particularly concerning.  She does note some "deepening" of her voice, but nothing specifically related to these episodes.  No issues chewing, eating or swallowing.  She does clear her throat.  No ENT issues otherwise.\par \par She has been following with Dr. Bauman of pulmonology.  In addition to above hx, "Quit smoking 1975 after 20 years up to 3ppd. Never had asthma but often bronchitis. Does have allergies. Is only on Ventolin now, 4-5x a week, w relief. [address], nothing wrong in apartment, no pets. 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 L mastectomy for cancer w chemo and RT ~7 years ago. Had HH repair. 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 Does have GERD, HH repair. Is currently seeing GI for eval for hemangioma of liver. Had Covid vax, Moderna, last dose 16 days ago."\par \par Dr. Bauman did change medication to Trelegy over the past 2 months and the patient has noticed a significant improvement in symptomatology.  She notes improvement in breathing and ability to do activities of daily living.    \par \par Diet\par +  citrus\par - caffeine, mint, tomatoes, spicy, fatty greasy foods, alcohol, carbonated beverages, blueberries\par 8-10 glasses of water daily.

## 2021-06-28 ENCOUNTER — APPOINTMENT (OUTPATIENT)
Dept: OTOLARYNGOLOGY | Facility: CLINIC | Age: 81
End: 2021-06-28
Payer: MEDICARE

## 2021-06-28 VITALS
BODY MASS INDEX: 23.03 KG/M2 | HEART RATE: 70 BPM | OXYGEN SATURATION: 97 % | HEIGHT: 61 IN | WEIGHT: 122 LBS | SYSTOLIC BLOOD PRESSURE: 144 MMHG | DIASTOLIC BLOOD PRESSURE: 79 MMHG

## 2021-06-28 DIAGNOSIS — H61.23 IMPACTED CERUMEN, BILATERAL: ICD-10-CM

## 2021-06-28 PROCEDURE — 69210 REMOVE IMPACTED EAR WAX UNI: CPT

## 2021-06-28 PROCEDURE — 99213 OFFICE O/P EST LOW 20 MIN: CPT | Mod: 25

## 2021-06-28 NOTE — PHYSICAL EXAM
[Normal] : external ears are normal bilaterally [de-identified] : bilateral cerumen, normal once removed.

## 2021-06-28 NOTE — ASSESSMENT
[FreeTextEntry1] : 82 yo female known to me for multiple throat issues.  Today she presents for cerumen removal.  This was done without issue.  Debrox PRN.  She does not want audio/tymp for questionable hx of SNHL\par \par Pt to follow up with SpP and then with me in 2-3 mo.\par \par - debrox prn\par - follow treatment regiment for throat related issues as previously described.

## 2021-06-28 NOTE — HISTORY OF PRESENT ILLNESS
[de-identified] : 80 yo female who presents at the request of Dr. Bauman for evaluation of laryngospasm.  She notes a long hx of asthma that has been managed with Ventolin for years.  Over the past few months however she has had episodes of sob/dyspnea that would be brought on be strong smells, odors, perfumes, and "thick air."  Her inhaler has not been as effective, she feels that she cannot bring air into her lungs and it can take several minutes to resolve.  These episodes have been exacerbated over the course of the pandemic secondary to cleaning products.  One episode during an MRI was particularly concerning.  She does note some "deepening" of her voice, but nothing specifically related to these episodes.  No issues chewing, eating or swallowing.  She does clear her throat.  No ENT issues otherwise.\par \par She has been following with Dr. Bauman of pulmonology.  In addition to above hx, "Quit smoking 1975 after 20 years up to 3ppd. Never had asthma but often bronchitis. Does have allergies. Is only on Ventolin now, 4-5x a week, w relief. Lives [address], nothing wrong in apartment, no pets. 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 L mastectomy for cancer w chemo and RT ~7 years ago. Had HH repair. 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 Does have GERD, HH repair. Is currently seeing GI for eval for hemangioma of liver. Had Covid vax, Moderna, last dose 16 days ago."\par \par Dr. Bauman did change medication to Trelegy over the past 2 months and the patient has noticed a significant improvement in symptomatology.  She notes improvement in breathing and ability to do activities of daily living.    \par \par Diet\par +  citrus\par - caffeine, mint, tomatoes, spicy, fatty greasy foods, alcohol, carbonated beverages, blueberries\par 8-10 glasses of water daily.\par - [FreeTextEntry1] : update 6/28/21\par No change in previous symptoms.  Has appt with SpP next week.  Presents today just for cerumen removal.  She did note that she had an audio/tymp over a year ago, and noted SNHL, but there has been no change and she does not want a repeat audio to follow.

## 2021-07-29 ENCOUNTER — APPOINTMENT (OUTPATIENT)
Dept: OTOLARYNGOLOGY | Facility: CLINIC | Age: 81
End: 2021-07-29
Payer: MEDICARE

## 2021-07-29 PROCEDURE — 92524 BEHAVRAL QUALIT ANALYS VOICE: CPT | Mod: GN

## 2021-07-29 PROCEDURE — 92507 TX SP LANG VOICE COMM INDIV: CPT | Mod: GN

## 2021-08-24 ENCOUNTER — APPOINTMENT (OUTPATIENT)
Dept: OTOLARYNGOLOGY | Facility: CLINIC | Age: 81
End: 2021-08-24
Payer: MEDICARE

## 2021-08-24 PROCEDURE — 92507 TX SP LANG VOICE COMM INDIV: CPT | Mod: GN

## 2021-09-28 ENCOUNTER — APPOINTMENT (OUTPATIENT)
Dept: OTOLARYNGOLOGY | Facility: CLINIC | Age: 81
End: 2021-09-28
Payer: MEDICARE

## 2021-09-28 PROCEDURE — 92507 TX SP LANG VOICE COMM INDIV: CPT | Mod: GN

## 2021-12-01 ENCOUNTER — APPOINTMENT (OUTPATIENT)
Dept: PULMONOLOGY | Facility: CLINIC | Age: 81
End: 2021-12-01
Payer: MEDICARE

## 2021-12-01 VITALS
HEIGHT: 61 IN | TEMPERATURE: 97.7 F | HEART RATE: 82 BPM | DIASTOLIC BLOOD PRESSURE: 70 MMHG | BODY MASS INDEX: 23.6 KG/M2 | SYSTOLIC BLOOD PRESSURE: 118 MMHG | WEIGHT: 125 LBS | OXYGEN SATURATION: 95 %

## 2021-12-01 DIAGNOSIS — J44.9 CHRONIC OBSTRUCTIVE PULMONARY DISEASE, UNSPECIFIED: ICD-10-CM

## 2021-12-01 PROCEDURE — 90662 IIV NO PRSV INCREASED AG IM: CPT

## 2021-12-01 PROCEDURE — G0009: CPT

## 2021-12-01 PROCEDURE — 90732 PPSV23 VACC 2 YRS+ SUBQ/IM: CPT

## 2021-12-01 PROCEDURE — 99213 OFFICE O/P EST LOW 20 MIN: CPT | Mod: 25

## 2021-12-01 PROCEDURE — 90472 IMMUNIZATION ADMIN EACH ADD: CPT

## 2021-12-01 NOTE — HISTORY OF PRESENT ILLNESS
[TextBox_4] : 04/12/2021: Asked to evaluate patient by Dr Fountain for dyspnea.  She was given a dx of asthma w COPD in 1991. This was mostly manageable w Ventolin. Symptoms have worsened w age. Very reactive to odors, perfumes etc. Can't run for a bus. Now she is triggered all the time from cleaning products during the pandemic, cab air fresheners. Gasping on a block. Quit smoking 1975 after 20 years up to 3ppd. Never had asthma but often bronchitis. Does have allergies. Is only on Ventolin now, 4-5x a week, w relief. Lives [address], nothing wrong in apartment, no pets. 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 L mastectomy for cancer w chemo and RT ~7 years ago. Had HH repair. 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 Her ability to do multiple small household chores is dramatically different. Has not needed Ventolin. Relates an experience a couple of years ago of extreme dyspnea after exposure to a nik shop, and then again about 2 mos ago in an MRI machine where she felt like no air was moving (though unclear, maybe able to speak), had to be removed from machine, maybe lasted one minute? And now needs another MRI and will not get back in machine. And again had similar experience this morning after or as using her Trelegy, and heard a terrible sound (which she replicates as a harsh inspiratory sound). Does have GERD, HH repair. Is currently seeing GI for eval for hemangioma of liver. Had Covid vax, Moderna, last dose 16 days ago.\par \par 12/1/21: Is doing well, loves Trelegy, has done voice therapy. Changed her insurance for better Trelegy coverage. Needs Covid booster. No interval exacerbations. Has had a MOHS in the interim, some dental work.\par

## 2021-12-01 NOTE — ASSESSMENT
[FreeTextEntry1] : Data reviewed:\par \par PA/lat CXR in office 04/12/2021 : severe scoliosis, clear lungs, no effusion\par \par Elijah 04/12/2021 : severe restriction, FEV1 37% / FENO 8\par PFT 4/12/21: severe EAO w sig response to BD (38-->49%), DLCO 44%\par Elijah 6/2/21: mod obstruction, FEV1 61%\par \par Impression:\par Severe COPD, very symptomatic, much improved on Trelegy\par Scoliosis, age 81\par \par Plan:\par Continue Trelegy. Doing great.\par [id number] and high dose flu given today.\par Follow up annually.

## 2022-02-18 ENCOUNTER — APPOINTMENT (OUTPATIENT)
Dept: VASCULAR SURGERY | Facility: CLINIC | Age: 82
End: 2022-02-18
Payer: MEDICARE

## 2022-02-18 PROCEDURE — 93880 EXTRACRANIAL BILAT STUDY: CPT

## 2022-02-23 ENCOUNTER — APPOINTMENT (OUTPATIENT)
Dept: MRI IMAGING | Facility: HOSPITAL | Age: 82
End: 2022-02-23
Payer: MEDICARE

## 2022-02-23 ENCOUNTER — OUTPATIENT (OUTPATIENT)
Dept: OUTPATIENT SERVICES | Facility: HOSPITAL | Age: 82
LOS: 1 days | End: 2022-02-23
Payer: MEDICARE

## 2022-02-23 DIAGNOSIS — Z90.12 ACQUIRED ABSENCE OF LEFT BREAST AND NIPPLE: Chronic | ICD-10-CM

## 2022-02-23 DIAGNOSIS — Z98.890 OTHER SPECIFIED POSTPROCEDURAL STATES: Chronic | ICD-10-CM

## 2022-02-23 DIAGNOSIS — Z98.49 CATARACT EXTRACTION STATUS, UNSPECIFIED EYE: Chronic | ICD-10-CM

## 2022-02-23 DIAGNOSIS — Z90.49 ACQUIRED ABSENCE OF OTHER SPECIFIED PARTS OF DIGESTIVE TRACT: Chronic | ICD-10-CM

## 2022-02-23 DIAGNOSIS — O03.9 COMPLETE OR UNSPECIFIED SPONTANEOUS ABORTION WITHOUT COMPLICATION: Chronic | ICD-10-CM

## 2022-02-23 PROCEDURE — 70544 MR ANGIOGRAPHY HEAD W/O DYE: CPT

## 2022-02-23 PROCEDURE — 70544 MR ANGIOGRAPHY HEAD W/O DYE: CPT | Mod: 26

## 2022-03-15 ENCOUNTER — APPOINTMENT (OUTPATIENT)
Dept: NEUROSURGERY | Facility: CLINIC | Age: 82
End: 2022-03-15
Payer: MEDICARE

## 2022-03-15 DIAGNOSIS — I65.22 OCCLUSION AND STENOSIS OF LEFT CAROTID ARTERY: ICD-10-CM

## 2022-03-15 PROCEDURE — 99443: CPT | Mod: 95

## 2022-03-15 NOTE — HISTORY OF PRESENT ILLNESS
[Home] : at home, [unfilled] , at the time of the visit. 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 The aneurysm was measured to be 3.3 mm in size and had been followed conservatively. A follow up MRA of the brain from December 11, 2019 suggested aneurysm growth to 6 mm. She doesn’t have family history of aneurysms or hypertension. We obtained a cerebral angiogram that confirmed the aneurysm measured 3 mm but was part of a dysplastic segment of the anterior communicating complex and it was embolized using coils in January 2020. She was also diagnosed with mild (40 percent) stenosis at the origin of the left ICA. \par \par An MRA of the brain from February 10, 2021 and compared with the study from July 2, 2020 that showed no recanalization of the embolized aneurysm. A follow up MRA is recommended in 1 year. (Feb 2022).\par \par  A carotid duplex from 2/8/21 suggested 50-69 percent stenosis of the  left carotid artery and she is asymptomatic from it. We are recommending daily baby aspirin and statin but she prefers not to take the aspirin as she has history of bleeding problems. A follow up duplex was recommended in 1 year. \par \par Today she presents toe review MRA head and US carotid duplex which are both stable.\par She denies new onset headache, right sided weakness/numbness/tingling, left vision loss. She still prefers not to take aspirin as part of 2/2 stroke prevention as she has a hx of bleeding problems.\par \par \par \par

## 2022-03-15 NOTE — DATA REVIEWED
[de-identified] : [facility]\par    [facility] Department of Radiology\par   Radiology Report\par \par \par Patient Name: LILI ZARCO   Report Date: 25-Feb-2022 11:03.00 \par Patient ID: [id number] ([id number]), [id number] ([id number])  Accession No.: [id number] \par Patient Birth Date: 1940  Report Status: F \par Referring Physician: [id number] TOY MARTINEZ   Reason For Study: s/p coil embolization of anterior communicating artery aneurysm;  \par \par \par \par \par ACC: [id number] EXAM: MR ANGIO BRAIN\par \par PROCEDURE DATE: 02/23/2022\par \par \par \par INTERPRETATION: Status post aneurysm coiling of an ACOMM aneurysm.\par \par Technique: MRA of the intracranial circulation was performed using 3-D time of flight technique. Axial flair and diffusion-weighted imaging were also performed.\par \par Findings:\par \par Comparison is made to a prior MRI of the cranial circulation performed on 2/10/2021.\par \par There is motion artifact that limits this evaluation.\par \par Evaluation of the anterior circulation demonstrates that the patient is status post coil embolization of the anterior communicating artery. There is no change in the dysplastic appearance of the right A1 and A2 junction. There is slight asymmetric enlargement of the right posterior cavernous segment of the internal carotid artery when compared to the left is likely accentuated due to motion artifact. Otherwise there is normal course and caliber of the distal internal carotid arteries. The left A1 segment of the anterior cerebral artery is not seen and is either hypoplastic or aplastic. There is a normal appearance to the bilateral anterior cerebral and middle cerebral arteries.\par \par Evaluation the posterior circulation demonstrates normal course and caliber of the bilateral vertebral arteries, vertebrobasilar junction and basilar artery. The bilateral P2 segments of the posterior cerebral artery are not well seen due to artifact\par \par There is no evidence of an acute infarction. There are scattered punctate and small patchy areas of FLAIR signal hyperintensities within the periventricular and subcortical white matter likely consistent with mild microvascular disease.\par \par Impression: Limited evaluation due to motion artifact. Status post aneurysm ACOMM coiling. 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 Feb 25 2022 11:03AM \par  [de-identified] : US carotid duplex 2/18/22: GABBY with <50% stenosis. LICA with 50-69% stenosis (stable when compared to 2/2021)

## 2022-03-15 NOTE — REASON FOR VISIT
[Follow-Up: _____] : a [unfilled] follow-up visit [FreeTextEntry1] : to review MRA head for hx of coil embolized A-comm aneurysm and US duplex for hx b/l carotid stenosis.

## 2022-03-15 NOTE — ADDENDUM
[FreeTextEntry1] : March 15, 2022\par \par Pato Fountain MD\par [address]\[place] [address]\par \par Patient name:  Miri Ruiz\par : 1940\par \par Dear Dr. Fountain:\par \par We spoke with Mrs. Ruiz in tele-health consultation at [facility].  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  The aneurysm was measured to be 3.3 mm in size and had been followed conservatively.  A follow up MRA of the brain from 2019 suggested aneurysm growth to 6 mm.  She doesn’t have family history of aneurysms or hypertension.  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  She was also diagnosed with mild (40 percent) stenosis at the origin of the left ICA.  \par \par Today we reviewed an MRA of the brain from 2022 and compared with the studies from  and .  There is no recanalization of the embolized aneurysm.  A follow up MRA is recommended in 2 years.  A carotid duplex from 2022 suggested unchanged 50-69 percent stenosis of the left carotid artery.  We are recommending daily baby aspirin and statin, but she prefers not to take the aspirin as she has history of bleeding problems.  A follow up duplex is recommended in 1 year.  She will follow up with us at that time.\par \par Thank you for allowing us to be part of Mrs. Ruiz’s care.  I will keep you updated at all times.\par \par Sincerely,\par \par \par Giovanni Ceballos MD\par Chief\par Neuro-Endovascular Surgery and \par Interventional Neuroradiology \par [facility]\par \par [facility]\par [address]\par [address] Floor\[place] [address]\par T:  [phone]\par F:  [phone]\par \par cc:	Leo Hemphill MD\par 	[address] [facility]\par 	[address]\[place] [address]\par \par 	Mrs. Elina Ruiz\par 	[address]\par 	[address]\[place] [address]\par \par \par \par

## 2022-03-15 NOTE — ASSESSMENT
[FreeTextEntry1] : Today we reviewed an MRA of the brain from February 23, 2022 and compared with the studies from 2020 and 2021.  There is no recanalization of the embolized aneurysm.  A follow up MRA is recommended in 2 years.  A carotid duplex from February 2022 suggested unchanged 50-69 percent stenosis of the left carotid artery.  We are recommending daily baby aspirin and statin, but she prefers not to take the aspirin as she has history of bleeding problems.  A follow up duplex is recommended in 1 year.  She will follow up with us at that time.\par \par \par \par Plan:\par 1. Repeat US carotid duplex x 1 yr and assess stability of carotid stenosis.\par 2. Repeat MRA head w/o contrast x 2 yrs to assess stability of treated A-comm aneurysm.\par \par \par

## 2022-05-23 ENCOUNTER — RX RENEWAL (OUTPATIENT)
Age: 82
End: 2022-05-23

## 2022-12-01 ENCOUNTER — RX RENEWAL (OUTPATIENT)
Age: 82
End: 2022-12-01

## 2022-12-01 ENCOUNTER — APPOINTMENT (OUTPATIENT)
Dept: PULMONOLOGY | Facility: CLINIC | Age: 82
End: 2022-12-01

## 2022-12-01 VITALS
DIASTOLIC BLOOD PRESSURE: 70 MMHG | OXYGEN SATURATION: 94 % | HEIGHT: 61 IN | HEART RATE: 84 BPM | SYSTOLIC BLOOD PRESSURE: 120 MMHG | TEMPERATURE: 97.5 F

## 2022-12-01 PROCEDURE — 90662 IIV NO PRSV INCREASED AG IM: CPT

## 2022-12-01 PROCEDURE — 99213 OFFICE O/P EST LOW 20 MIN: CPT | Mod: 25

## 2022-12-01 PROCEDURE — G0008: CPT

## 2022-12-01 RX ORDER — ALBUTEROL SULFATE 90 UG/1
108 (90 BASE) INHALANT RESPIRATORY (INHALATION)
Qty: 90 | Refills: 0 | Status: ACTIVE | COMMUNITY
Start: 2022-12-01 | End: 1900-01-01

## 2022-12-01 NOTE — HISTORY OF PRESENT ILLNESS
[TextBox_4] : 04/12/2021: Asked to evaluate patient by Dr Fountain for dyspnea.  She was given a dx of asthma w COPD in 1991. This was mostly manageable w Ventolin. Symptoms have worsened w age. Very reactive to odors, perfumes etc. Can't run for a bus. Now she is triggered all the time from cleaning products during the pandemic, cab air fresheners. Gasping on a block. Quit smoking 1975 after 20 years up to 3ppd. Never had asthma but often bronchitis. Does have allergies. Is only on Ventolin now, 4-5x a week, w relief. [address], nothing wrong in apartment, no pets. 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 L mastectomy for cancer w chemo and RT ~7 years ago. Had HH repair. 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 Her ability to do multiple small household chores is dramatically different. Has not needed Ventolin. Relates an experience a couple of years ago of extreme dyspnea after exposure to a nik shop, and then again about 2 mos ago in an MRI machine where she felt like no air was moving (though unclear, maybe able to speak), had to be removed from machine, maybe lasted one minute? And now needs another MRI and will not get back in machine. And again had similar experience this morning after or as using her Trelegy, and heard a terrible sound (which she replicates as a harsh inspiratory sound). Does have GERD, HH repair. Is currently seeing GI for eval for hemangioma of liver. Had Covid vax, Moderna, last dose 16 days ago.\par \par 12/1/21: Is doing well, loves Trelegy, has done voice therapy. Changed her insurance for better Trelegy coverage. Needs Covid booster. No interval exacerbations. Has had a MOHS in the interim, some dental work.\par \par 12/1/22: Dealing with a herniated disk, has arthritis in her spine. Limited now more by pain than breathing. Remains on the Trelegy and happy with it. No exacerbations. Uses Ventolin very infrequently. Had new Covid booster. Needs flu shot.\par

## 2022-12-01 NOTE — CONSULT LETTER
[Dear  ___] : Dear  [unfilled], [Courtesy Letter:] : I had the pleasure of seeing your patient, [unfilled], in my office today. [Please see my note below.] : Please see my note below. [Consult Closing:] : Thank you very much for allowing me to participate in the care of this patient.  If you have any questions, please do not hesitate to contact me. [Sincerely,] : Sincerely, [FreeTextEntry2] : Pato Fountain, DO\par [address] \[place] [address] [FreeTextEntry3] : Rosian Bauman MD, FCCP\par

## 2022-12-01 NOTE — ASSESSMENT
[FreeTextEntry1] : Data reviewed:\par \par PA/lat CXR in office 04/12/2021 : severe scoliosis, clear lungs, no effusion\par \par Elijah 04/12/2021 : severe restriction, FEV1 37% / FENO 8\par PFT 4/12/21: severe EAO w sig response to BD (38-->49%), DLCO 44%\par Elijah 6/2/21: mod obstruction, FEV1 61%\par \par Impression:\par Severe COPD, very symptomatic, much improved on Trelegy\par Scoliosis\par \par Plan:\par Continue Trelegy. Doing great.\par High dose flu given today.\par Follow up annually.

## 2023-08-29 ENCOUNTER — APPOINTMENT (OUTPATIENT)
Dept: NEUROSURGERY | Facility: CLINIC | Age: 83
End: 2023-08-29
Payer: MEDICARE

## 2023-08-29 DIAGNOSIS — I67.1 CEREBRAL ANEURYSM, NONRUPTURED: ICD-10-CM

## 2023-08-29 DIAGNOSIS — I65.29 OCCLUSION AND STENOSIS OF UNSPECIFIED CAROTID ARTERY: ICD-10-CM

## 2023-08-29 DIAGNOSIS — Z98.890 OTHER SPECIFIED POSTPROCEDURAL STATES: ICD-10-CM

## 2023-08-29 DIAGNOSIS — Z87.891 PERSONAL HISTORY OF NICOTINE DEPENDENCE: ICD-10-CM

## 2023-08-29 PROCEDURE — 99441: CPT

## 2023-08-29 NOTE — ASSESSMENT
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 The aneurysm was measured to be 3.3 mm in size in 2018. A follow up MRA of the brain from December 11, 2019 suggested aneurysm growth to 6 mm.  S/P cerebral angiogram 1/17/2020 with Dr. 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 She was also diagnosed with mild (40 percent) stenosis at the origin of the left ICA. She was last seen for f/u March 2022 and MRA/US stable.  Returned today for routine follow- up. No complaints for today's visit.   Will order MRA and US. She should RTC after imaging completed for evaluation with Dr. Ty.

## 2023-08-29 NOTE — HISTORY OF PRESENT ILLNESS
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 The aneurysm was measured to be 3.3 mm in size in 2018. A follow up MRA of the brain from December 11, 2019 suggested aneurysm growth to 6 mm.  S/P cerebral angiogram 1/17/2020 with Dr. 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 She was also diagnosed with mild (40 percent) stenosis at the origin of the left ICA.  - An MRA of the brain from 2/10/21 and compared with the study from July 2, 2020 that showed no recanalization of the embolized aneurysm.  - A carotid duplex from 2/8/21 suggested 50-69 percent stenosis of the left carotid artery and she is asymptomatic from it.  - Pt was recommended Aspirin 81mg and statin. She preferred not to take aspirin s/t history of bleeding problems.   - Pt returned f/u 3/15/22 and MRA/US stable. Recommended to repeat 1 year.   TODAY pt returns for follow- up. Denies any complaints or changes since prior visit.  Denies headaches, dizziness, other new/worsening focal neuro deficits.

## 2023-08-29 NOTE — REVIEW OF SYSTEMS
[Fever] : no fever [Chills] : no chills [As Noted in HPI] : as noted in HPI [Confused or Disoriented] : no confusion [Seizures] : no convulsions [Dizziness] : no dizziness [Chest Pain] : no chest pain [Palpitations] : no palpitations [Shortness Of Breath] : no shortness of breath

## 2023-08-29 NOTE — REASON FOR VISIT
[Follow-Up: _____] : a [unfilled] follow-up visit [Home] : at home, [unfilled] , at the time of the visit. [Medical Office: ([facility])___] : at the medical office located in  [Verbal consent obtained from patient] : the patient, [unfilled]

## 2023-09-26 ENCOUNTER — APPOINTMENT (OUTPATIENT)
Dept: MRI IMAGING | Facility: HOSPITAL | Age: 83
End: 2023-09-26

## 2023-09-26 ENCOUNTER — APPOINTMENT (OUTPATIENT)
Dept: ULTRASOUND IMAGING | Facility: HOSPITAL | Age: 83
End: 2023-09-26

## 2023-09-29 ENCOUNTER — APPOINTMENT (OUTPATIENT)
Dept: PULMONOLOGY | Facility: CLINIC | Age: 83
End: 2023-09-29

## 2023-10-10 ENCOUNTER — APPOINTMENT (OUTPATIENT)
Dept: NEUROSURGERY | Facility: CLINIC | Age: 83
End: 2023-10-10

## 2023-11-03 ENCOUNTER — APPOINTMENT (OUTPATIENT)
Dept: PULMONOLOGY | Facility: CLINIC | Age: 83
End: 2023-11-03
Payer: MEDICARE

## 2023-11-03 VITALS
TEMPERATURE: 98.3 F | HEART RATE: 80 BPM | SYSTOLIC BLOOD PRESSURE: 120 MMHG | OXYGEN SATURATION: 95 % | HEIGHT: 61 IN | DIASTOLIC BLOOD PRESSURE: 70 MMHG

## 2023-11-03 PROCEDURE — 90662 IIV NO PRSV INCREASED AG IM: CPT

## 2023-11-03 PROCEDURE — 99213 OFFICE O/P EST LOW 20 MIN: CPT | Mod: 25

## 2023-11-03 PROCEDURE — G0008: CPT

## 2023-11-03 RX ORDER — ALBUTEROL SULFATE 90 UG/1
108 (90 BASE) AEROSOL, METERED RESPIRATORY (INHALATION) EVERY 4 HOURS
Qty: 1 | Refills: 3 | Status: ACTIVE | COMMUNITY
Start: 2022-12-01 | End: 1900-01-01

## 2024-06-24 ENCOUNTER — RX RENEWAL (OUTPATIENT)
Age: 84
End: 2024-06-24

## 2024-06-24 RX ORDER — FLUTICASONE FUROATE, UMECLIDINIUM BROMIDE AND VILANTEROL TRIFENATATE 100; 62.5; 25 UG/1; UG/1; UG/1
100-62.5-25 POWDER RESPIRATORY (INHALATION)
Qty: 180 | Refills: 1 | Status: ACTIVE | COMMUNITY
Start: 2021-04-12 | End: 1900-01-01